# Patient Record
Sex: FEMALE | HISPANIC OR LATINO | Employment: FULL TIME | URBAN - METROPOLITAN AREA
[De-identification: names, ages, dates, MRNs, and addresses within clinical notes are randomized per-mention and may not be internally consistent; named-entity substitution may affect disease eponyms.]

---

## 2024-02-19 ENCOUNTER — LAB (OUTPATIENT)
Dept: LAB | Facility: LAB | Age: 29
End: 2024-02-19
Payer: COMMERCIAL

## 2024-02-19 ENCOUNTER — OFFICE VISIT (OUTPATIENT)
Dept: OBSTETRICS AND GYNECOLOGY | Facility: CLINIC | Age: 29
End: 2024-02-19
Payer: COMMERCIAL

## 2024-02-19 VITALS
SYSTOLIC BLOOD PRESSURE: 102 MMHG | BODY MASS INDEX: 31.15 KG/M2 | DIASTOLIC BLOOD PRESSURE: 64 MMHG | HEIGHT: 61 IN | WEIGHT: 165 LBS

## 2024-02-19 DIAGNOSIS — Z32.00 ENCOUNTER FOR CONFIRMATION OF PREGNANCY TEST RESULT WITH PHYSICAL EXAMINATION: ICD-10-CM

## 2024-02-19 DIAGNOSIS — Z32.01 PREGNANCY TEST POSITIVE (HHS-HCC): ICD-10-CM

## 2024-02-19 DIAGNOSIS — O99.210 OBESITY DURING PREGNANCY (HHS-HCC): ICD-10-CM

## 2024-02-19 DIAGNOSIS — N89.8 VAGINAL ITCHING: ICD-10-CM

## 2024-02-19 DIAGNOSIS — Z32.00 ENCOUNTER FOR CONFIRMATION OF PREGNANCY TEST RESULT WITH PHYSICAL EXAMINATION: Primary | ICD-10-CM

## 2024-02-19 PROBLEM — O99.019 ANEMIA IN PREGNANCY (HHS-HCC): Status: RESOLVED | Noted: 2024-02-19 | Resolved: 2024-02-19

## 2024-02-19 LAB
ABO GROUP (TYPE) IN BLOOD: NORMAL
ANTIBODY SCREEN: NORMAL
CRL: 21 MM
ERYTHROCYTE [DISTWIDTH] IN BLOOD BY AUTOMATED COUNT: 14.2 % (ref 11.5–14.5)
HCT VFR BLD AUTO: 35.7 % (ref 36–46)
HGB BLD-MCNC: 11.6 G/DL (ref 12–16)
MCH RBC QN AUTO: 30.8 PG (ref 26–34)
MCHC RBC AUTO-ENTMCNC: 32.5 G/DL (ref 32–36)
MCV RBC AUTO: 95 FL (ref 80–100)
NRBC BLD-RTO: 0 /100 WBCS (ref 0–0)
PLATELET # BLD AUTO: 206 X10*3/UL (ref 150–450)
PREGNANCY TEST URINE, POC: POSITIVE
RBC # BLD AUTO: 3.77 X10*6/UL (ref 4–5.2)
RH FACTOR (ANTIGEN D): NORMAL
WBC # BLD AUTO: 7.8 X10*3/UL (ref 4.4–11.3)

## 2024-02-19 PROCEDURE — 76817 TRANSVAGINAL US OBSTETRIC: CPT | Performed by: NURSE PRACTITIONER

## 2024-02-19 PROCEDURE — 99213 OFFICE O/P EST LOW 20 MIN: CPT | Performed by: NURSE PRACTITIONER

## 2024-02-19 PROCEDURE — 1036F TOBACCO NON-USER: CPT | Performed by: NURSE PRACTITIONER

## 2024-02-19 PROCEDURE — 81025 URINE PREGNANCY TEST: CPT | Performed by: NURSE PRACTITIONER

## 2024-02-19 PROCEDURE — 36415 COLL VENOUS BLD VENIPUNCTURE: CPT

## 2024-02-19 PROCEDURE — 86317 IMMUNOASSAY INFECTIOUS AGENT: CPT

## 2024-02-19 PROCEDURE — 86850 RBC ANTIBODY SCREEN: CPT

## 2024-02-19 PROCEDURE — 87389 HIV-1 AG W/HIV-1&-2 AB AG IA: CPT

## 2024-02-19 PROCEDURE — 86780 TREPONEMA PALLIDUM: CPT

## 2024-02-19 PROCEDURE — 83036 HEMOGLOBIN GLYCOSYLATED A1C: CPT

## 2024-02-19 PROCEDURE — 87340 HEPATITIS B SURFACE AG IA: CPT

## 2024-02-19 PROCEDURE — 87800 DETECT AGNT MULT DNA DIREC: CPT

## 2024-02-19 PROCEDURE — 85027 COMPLETE CBC AUTOMATED: CPT

## 2024-02-19 PROCEDURE — 86803 HEPATITIS C AB TEST: CPT

## 2024-02-19 PROCEDURE — 87205 SMEAR GRAM STAIN: CPT

## 2024-02-19 PROCEDURE — 86900 BLOOD TYPING SEROLOGIC ABO: CPT

## 2024-02-19 PROCEDURE — 86901 BLOOD TYPING SEROLOGIC RH(D): CPT

## 2024-02-19 ASSESSMENT — ENCOUNTER SYMPTOMS
RESPIRATORY NEGATIVE: 1
VOMITING: 1
HEADACHES: 1
NAUSEA: 1
EYES NEGATIVE: 1
ALLERGIC/IMMUNOLOGIC NEGATIVE: 1
HEMATOLOGIC/LYMPHATIC NEGATIVE: 1
FATIGUE: 1
ENDOCRINE NEGATIVE: 1
CARDIOVASCULAR NEGATIVE: 1
CONSTIPATION: 1
MUSCULOSKELETAL NEGATIVE: 1

## 2024-02-19 NOTE — PROGRESS NOTES
"Chief Complaint    GYN - PREGNANCY CONFIRMATION        HPI    PREGNANCY CONFIRMATION    LMP - 2023  G 2  P 1  8 W   4 D  CALLIE 2024    LAST PAP - 2021 NEG  Last edited by Una Yusuf MA on 2024  3:34 PM.         Naa Sharif is a 28 y.o.  female who presents for a pregnancy confirmation.     Patient's last menstrual period was 2023 (approximate).  She is 8.4 weeks gestation with CALLIE 2024.  Patient's menstrual cycles are mostly regular, every 28 days, lasting 5-6 days. She had x2 cycles in November.   She had +home pregnancy test mid-January.   Denies vaginal bleeding.  Reports symptoms of nausea, vomiting. Also endorses fatigue, vaginal itching.   She is a non-smoker.    Her pregnancy is complicated by:  Obesity    PMH  Anxiety, depression    /64   Ht 1.549 m (5' 1\")   Wt 74.8 kg (165 lb)   LMP 2023 (Approximate)   BMI 31.18 kg/m²      Review of Systems   Constitutional:  Positive for fatigue.   HENT: Negative.     Eyes: Negative.    Respiratory: Negative.     Cardiovascular: Negative.    Gastrointestinal:  Positive for constipation, nausea and vomiting.   Endocrine: Negative.    Genitourinary:         +Vaginal itching   Musculoskeletal: Negative.    Skin: Negative.    Allergic/Immunologic: Negative.    Neurological:  Positive for headaches.   Hematological: Negative.    Psychiatric/Behavioral:          +Anxiety   All other systems reviewed and are negative.       Physical Exam  Constitutional:       Appearance: Normal appearance.   HENT:      Head: Normocephalic.   Pulmonary:      Effort: Pulmonary effort is normal.   Genitourinary:     Vagina: Vaginal discharge (White) present.      Cervix: Normal.      Uterus: Normal.       Adnexa: Right adnexa normal and left adnexa normal.      Rectum: Normal.          Comments: Labial erythema  Musculoskeletal:         General: Normal range of motion.      Cervical back: Normal range of motion and neck supple. "   Skin:     General: Skin is warm and dry.   Neurological:      Mental Status: She is alert.   Psychiatric:         Mood and Affect: Mood normal.         Behavior: Behavior normal.        BSUS: Single IUP, +YS and +FCA. CRL 8.4 weeks gestation.     Assessment/Plan   Diagnoses and all orders for this visit:  Encounter for confirmation of pregnancy test result with physical examination  -Seen today for confirmation of pregnancy.  -Pregnancy confirmed with +urine pregnancy test and trans-vaginal US performed today.  -     US OB transvaginal  -Viable, godinez pregnancy. +fetal heartbeat with US confirming gestational age that is c/w LMP.  -CALLIE 09/26/2024.  -Orders for prenatal lab work today:  -     CBC Anemia Panel With Reflex,Pregnancy; Future  -     Hepatitis B Surface Antigen; Future  -     Hepatitis C Antibody; Future  -     HIV 1/2 Antigen/Antibody Screen with Reflex to Confirmation; Future  -     Rubella Antibody, IgG; Future  -     Syphilis Screen with Reflex; Future  -     Type And Screen; Future  -Genetic screening options discussed, patient considering.   -Follow up for new OB 2-3 weeks.   Pregnancy test positive  -     POCT pregnancy, urine manually resulted  Obesity during pregnancy  -     Hemoglobin A1C; Future  Vaginal itching  -Vaginal cultures collected:  -     Vaginitis Gram Stain For Bacterial Vaginosis + Yeast  -     C. Trachomatis / N. Gonorrhoeae, Amplified Detection

## 2024-02-20 LAB
C TRACH RRNA SPEC QL NAA+PROBE: NEGATIVE
CLUE CELLS VAG LPF-#/AREA: PRESENT /[LPF]
EST. AVERAGE GLUCOSE BLD GHB EST-MCNC: 108 MG/DL
HBA1C MFR BLD: 5.4 %
HBV SURFACE AG SERPL QL IA: NONREACTIVE
HCV AB SER QL: NONREACTIVE
HIV 1+2 AB+HIV1 P24 AG SERPL QL IA: NONREACTIVE
N GONORRHOEA DNA SPEC QL PROBE+SIG AMP: NEGATIVE
NUGENT SCORE: 8
REFLEX ADDED, ANEMIA PANEL: NORMAL
RUBV IGG SERPL IA-ACNC: 1.6 IA
RUBV IGG SERPL QL IA: POSITIVE
TREPONEMA PALLIDUM IGG+IGM AB [PRESENCE] IN SERUM OR PLASMA BY IMMUNOASSAY: NONREACTIVE
YEAST VAG WET PREP-#/AREA: ABNORMAL

## 2024-02-21 DIAGNOSIS — B96.89 BACTERIAL VAGINOSIS: Primary | ICD-10-CM

## 2024-02-21 DIAGNOSIS — N76.0 BACTERIAL VAGINOSIS: Primary | ICD-10-CM

## 2024-02-21 RX ORDER — METRONIDAZOLE 500 MG/1
500 TABLET ORAL 2 TIMES DAILY
Qty: 14 TABLET | Refills: 0 | Status: SHIPPED | OUTPATIENT
Start: 2024-02-21 | End: 2024-02-28

## 2024-03-04 ENCOUNTER — INITIAL PRENATAL (OUTPATIENT)
Dept: OBSTETRICS AND GYNECOLOGY | Facility: CLINIC | Age: 29
End: 2024-03-04
Payer: COMMERCIAL

## 2024-03-04 VITALS — DIASTOLIC BLOOD PRESSURE: 60 MMHG | WEIGHT: 165 LBS | SYSTOLIC BLOOD PRESSURE: 106 MMHG | BODY MASS INDEX: 31.18 KG/M2

## 2024-03-04 DIAGNOSIS — O99.210 OBESITY DURING PREGNANCY (HHS-HCC): ICD-10-CM

## 2024-03-04 DIAGNOSIS — Z34.81 PRENATAL CARE, SUBSEQUENT PREGNANCY, FIRST TRIMESTER (HHS-HCC): Primary | ICD-10-CM

## 2024-03-04 DIAGNOSIS — B37.9 YEAST INFECTION: ICD-10-CM

## 2024-03-04 DIAGNOSIS — Z3A.10 10 WEEKS GESTATION OF PREGNANCY (HHS-HCC): ICD-10-CM

## 2024-03-04 DIAGNOSIS — Z12.4 CERVICAL CANCER SCREENING: ICD-10-CM

## 2024-03-04 LAB
POC APPEARANCE, URINE: CLEAR
POC BILIRUBIN, URINE: NEGATIVE
POC BLOOD, URINE: NEGATIVE
POC COLOR, URINE: YELLOW
POC GLUCOSE, URINE: NEGATIVE MG/DL
POC KETONES, URINE: NEGATIVE MG/DL
POC LEUKOCYTES, URINE: NEGATIVE
POC NITRITE,URINE: NEGATIVE
POC PH, URINE: 6.5 PH
POC PROTEIN, URINE: NEGATIVE MG/DL
POC SPECIFIC GRAVITY, URINE: 1.02
POC UROBILINOGEN, URINE: 0.2 EU/DL

## 2024-03-04 PROCEDURE — 0500F INITIAL PRENATAL CARE VISIT: CPT | Performed by: NURSE PRACTITIONER

## 2024-03-04 PROCEDURE — 88175 CYTOPATH C/V AUTO FLUID REDO: CPT

## 2024-03-04 PROCEDURE — 87624 HPV HI-RISK TYP POOLED RSLT: CPT

## 2024-03-04 PROCEDURE — 87086 URINE CULTURE/COLONY COUNT: CPT

## 2024-03-04 ASSESSMENT — SOCIAL DETERMINANTS OF HEALTH (SDOH)
WITHIN THE LAST YEAR, HAVE YOU BEEN HUMILIATED OR EMOTIONALLY ABUSED IN OTHER WAYS BY YOUR PARTNER OR EX-PARTNER?: NO
WITHIN THE LAST YEAR, HAVE YOU BEEN KICKED, HIT, SLAPPED, OR OTHERWISE PHYSICALLY HURT BY YOUR PARTNER OR EX-PARTNER?: NO
WITHIN THE LAST YEAR, HAVE YOU BEEN AFRAID OF YOUR PARTNER OR EX-PARTNER?: NO
WITHIN THE LAST YEAR, HAVE TO BEEN RAPED OR FORCED TO HAVE ANY KIND OF SEXUAL ACTIVITY BY YOUR PARTNER OR EX-PARTNER?: NO

## 2024-03-04 NOTE — PROGRESS NOTES
Naa Sharif 28 y.o.  at 10w4d with a working estimated date of delivery of Estimated Date of Delivery: 24 who presents for initial prenatal visit.   She is feeling well overall. Reports nausea, manageable. Mostly smell aversion related. Denies vaginal bleeding. She was treated for BV, symptoms improved. Has x2 days left of antibiotic. Also reports some constipation, advised fluids, increasing fiber, and may try stool softener as needed.   Counseled and advised flu vaccine, patient declines.     Her pregnancy is complicated by:  Obesity    Pap w/HPV testing collected today.   NG/CT Negative   Ucx collected today  Prenatal labs normal.   Genetic screening options discussed, patient declines.     Objective   Fetal Heart Rate: +US  BP: 106/60  Urine Dipstick  Urine Protein: NEGATIVE  Urine Leukocytes: NEGATIVE  Urine Ketone: NEGATIVE  Urine Glucose: NEGATIVE  Weight  Weight: 74.8 kg (165 lb)     BSUS: S=D, +FCA and +FM      Assessment/Plan   Diagnoses and all orders for this visit:  Prenatal care, subsequent pregnancy, first trimester  -     Urine culture  10 weeks gestation of pregnancy  -     POCT UA (nonautomated) manually resulted  Cervical cancer screening  -     THINPREP PAP  Obesity during pregnancy   Patient oriented to practice including shared OB call and how to reach physician on call  Declines genetics.  Pap and urine cultures collected today.  Follow up routine prenatal visit 4 weeks or sooner if needed.

## 2024-03-05 LAB — BACTERIA UR CULT: NORMAL

## 2024-03-18 LAB
CYTOLOGY CMNT CVX/VAG CYTO-IMP: NORMAL
HPV HR 12 DNA GENITAL QL NAA+PROBE: POSITIVE
HPV HR GENOTYPES PNL CVX NAA+PROBE: POSITIVE
HPV16 DNA SPEC QL NAA+PROBE: NEGATIVE
HPV18 DNA SPEC QL NAA+PROBE: NEGATIVE
LAB AP HPV GENOTYPE QUESTION: YES
LAB AP HPV HR: NORMAL
LABORATORY COMMENT REPORT: NORMAL
LMP START DATE: NORMAL
MENSTRUAL HX REPORTED: NORMAL
PATH REPORT.TOTAL CANCER: NORMAL

## 2024-03-22 ENCOUNTER — TELEPHONE (OUTPATIENT)
Dept: OBSTETRICS AND GYNECOLOGY | Facility: CLINIC | Age: 29
End: 2024-03-22
Payer: COMMERCIAL

## 2024-03-22 RX ORDER — TERCONAZOLE 4 MG/G
1 CREAM VAGINAL NIGHTLY
Qty: 45 G | Refills: 0 | Status: SHIPPED | OUTPATIENT
Start: 2024-03-22 | End: 2024-03-29

## 2024-04-01 ENCOUNTER — ROUTINE PRENATAL (OUTPATIENT)
Dept: OBSTETRICS AND GYNECOLOGY | Facility: CLINIC | Age: 29
End: 2024-04-01
Payer: COMMERCIAL

## 2024-04-01 VITALS — BODY MASS INDEX: 31.55 KG/M2 | DIASTOLIC BLOOD PRESSURE: 70 MMHG | WEIGHT: 167 LBS | SYSTOLIC BLOOD PRESSURE: 110 MMHG

## 2024-04-01 DIAGNOSIS — Z34.80 SUPERVISION OF OTHER NORMAL PREGNANCY, ANTEPARTUM (HHS-HCC): Primary | ICD-10-CM

## 2024-04-01 DIAGNOSIS — Z3A.14 14 WEEKS GESTATION OF PREGNANCY (HHS-HCC): ICD-10-CM

## 2024-04-01 LAB
POC BLOOD, URINE: NEGATIVE
POC GLUCOSE, URINE: NEGATIVE MG/DL
POC KETONES, URINE: NEGATIVE MG/DL
POC LEUKOCYTES, URINE: NEGATIVE
POC NITRITE,URINE: NEGATIVE
POC PROTEIN, URINE: NEGATIVE MG/DL

## 2024-04-01 PROCEDURE — 0501F PRENATAL FLOW SHEET: CPT | Performed by: OBSTETRICS & GYNECOLOGY

## 2024-04-01 NOTE — PROGRESS NOTES
Subjective   Patient ID [id number]   Naa Sharif is a 28 y.o.  at 14w4d with a working estimated date of delivery of 2024, by Last Menstrual Period who presents for a routine prenatal visit. She denies vaginal bleeding, leakage of fluid, decreased fetal movements, or contractions.  +FM, no LOF, VB, ctx    She reports she is feeling okay. She has fatigue and has not had much of an appetite but does force herself to eat.     Her pregnancy is complicated by:  Obesity BMI 31    Assessment/Plan   Continue prenatal vitamin.  Labs reviewed.  Anatomy scan ordered  Follow up in 4 weeks for a routine prenatal visit.      Scribe Attestation  By signing my name below, IAretha Scribe   attest that this documentation has been prepared under the direction and in the presence of Sebas Jay MD.    Scribe Attestation  By signing my name below, Neha CHACON Scribe   attest that this documentation has been prepared under the direction and in the presence of Sebas Jay MD.

## 2024-04-30 NOTE — PROGRESS NOTES
Subjective   Patient ID [id number]   Naa Sharif is a 28 y.o.  at 19w0d, +FM, no VB/LOF/Cramping. Little discharge.    Pregnancy complicated by:  BMI 31  HPV positive    Objective   Physical Exam  Weight: 78.3 kg (172 lb 9.6 oz)  BP: 106/60  Fetal Heart Rate: 150 Fundal Height (cm): 20 cm    Lab Results   Component Value Date    HGB 11.6 (L) 2024    HCT 35.7 (L) 2024       Assessment/Plan   Anatomy ultrasound next Friday.  Follow up in 4 weeks for a routine prenatal visit.

## 2024-05-02 ENCOUNTER — ROUTINE PRENATAL (OUTPATIENT)
Dept: OBSTETRICS AND GYNECOLOGY | Facility: CLINIC | Age: 29
End: 2024-05-02
Payer: COMMERCIAL

## 2024-05-02 VITALS — BODY MASS INDEX: 32.61 KG/M2 | WEIGHT: 172.6 LBS | SYSTOLIC BLOOD PRESSURE: 106 MMHG | DIASTOLIC BLOOD PRESSURE: 60 MMHG

## 2024-05-02 DIAGNOSIS — Z34.80 SUPERVISION OF OTHER NORMAL PREGNANCY, ANTEPARTUM (HHS-HCC): ICD-10-CM

## 2024-05-02 LAB
POC GLUCOSE, URINE: NEGATIVE MG/DL
POC KETONES, URINE: ABNORMAL MG/DL
POC PROTEIN, URINE: ABNORMAL MG/DL

## 2024-05-02 PROCEDURE — 0501F PRENATAL FLOW SHEET: CPT | Performed by: OBSTETRICS & GYNECOLOGY

## 2024-05-10 ENCOUNTER — ANCILLARY ORDERS (OUTPATIENT)
Dept: OBSTETRICS AND GYNECOLOGY | Facility: CLINIC | Age: 29
End: 2024-05-10

## 2024-05-10 ENCOUNTER — HOSPITAL ENCOUNTER (OUTPATIENT)
Dept: RADIOLOGY | Facility: CLINIC | Age: 29
Discharge: HOME | End: 2024-05-10
Payer: COMMERCIAL

## 2024-05-10 DIAGNOSIS — Z34.80 SUPERVISION OF OTHER NORMAL PREGNANCY, ANTEPARTUM (HHS-HCC): ICD-10-CM

## 2024-05-10 DIAGNOSIS — O99.210 OBESITY AFFECTING PREGNANCY (HHS-HCC): ICD-10-CM

## 2024-05-10 DIAGNOSIS — O99.210 OBESITY AFFECTING PREGNANCY (HHS-HCC): Primary | ICD-10-CM

## 2024-05-10 PROCEDURE — 76811 OB US DETAILED SNGL FETUS: CPT

## 2024-05-10 PROCEDURE — 76811 OB US DETAILED SNGL FETUS: CPT | Performed by: OBSTETRICS & GYNECOLOGY

## 2024-05-31 ENCOUNTER — ROUTINE PRENATAL (OUTPATIENT)
Dept: OBSTETRICS AND GYNECOLOGY | Facility: CLINIC | Age: 29
End: 2024-05-31
Payer: COMMERCIAL

## 2024-05-31 VITALS — WEIGHT: 178 LBS | SYSTOLIC BLOOD PRESSURE: 114 MMHG | DIASTOLIC BLOOD PRESSURE: 60 MMHG | BODY MASS INDEX: 33.63 KG/M2

## 2024-05-31 DIAGNOSIS — Z34.80 SUPERVISION OF OTHER NORMAL PREGNANCY, ANTEPARTUM (HHS-HCC): ICD-10-CM

## 2024-05-31 LAB
POC APPEARANCE, URINE: CLEAR
POC BILIRUBIN, URINE: NEGATIVE
POC BLOOD, URINE: NEGATIVE
POC COLOR, URINE: YELLOW
POC GLUCOSE, URINE: NEGATIVE MG/DL
POC KETONES, URINE: NEGATIVE MG/DL
POC LEUKOCYTES, URINE: NEGATIVE
POC NITRITE,URINE: NEGATIVE
POC PH, URINE: 6.5 PH
POC PROTEIN, URINE: NEGATIVE MG/DL
POC SPECIFIC GRAVITY, URINE: 1.01
POC UROBILINOGEN, URINE: 0.2 EU/DL

## 2024-05-31 PROCEDURE — 0501F PRENATAL FLOW SHEET: CPT | Performed by: OBSTETRICS & GYNECOLOGY

## 2024-05-31 NOTE — PATIENT INSTRUCTIONS
You were seen in the office today for routine OB care and had normal findings on exam  Continue routine OB precautions at home  Continue taking prenatal vitamins   Avoid sick contacts and consider getting your Flu (available in office during flu season) and COVID vaccines to protect against infection in pregnancy  You will be given an order for your anatomy ultrasound. Please schedule at [facility] OB imaging between 19 and 22 weeks gestation [phone]  You will be given a bottle of Glucola and orders for your second trimester labs. Please complete these between 25 and 28 weeks gestation. You may complete these at any  outpatient lab, and do not need an appointment  Make an appointment for routine care in the office in the next 4 weeks  If you are having any concerns prior to your next visit please call the office to speak to the physician on call. This includes after hours, weekends, and holidays, when the answering service will be able to connect you with the physician on call. [phone] ([place] Office) or [phone] [place] [facility].

## 2024-05-31 NOTE — PROGRESS NOTES
Subjective   Patient ID [id number]   Naa Sharif is a 28 y.o.  at 23w1d with a working estimated date of delivery of 2024, by Last Menstrual Period who presents for a routine prenatal visit. She denies vaginal bleeding, leakage of fluid, decreased fetal movements, or contractions.    Her pregnancy is complicated by:  HPV pos  Obesity    Objective   Physical Exam:   Weight: 80.7 kg (178 lb)  Expected Total Weight Gain: 5 kg (11 lb)-9 kg (19 lb)   Pregravid BMI: 31.19  BP: 114/60                  Prenatal Labs  Urine Dip:  Lab Results   Component Value Date    KETONESU NEGATIVE 2024     Lab Results   Component Value Date    HGB 11.6 (L) 2024    HCT 35.7 (L) 2024    ABO O 2024    HEPBSAG Nonreactive 2024           Imagin weeks presentation    Assessment/Plan   Problem List Items Addressed This Visit    None  Visit Diagnoses         Codes    Supervision of other normal pregnancy, antepartum ([facility])     Z34.80    Patient doing well   GTT/CBC ordered  Precautions given  RTO 4 weeks JOCELYN     Relevant Orders    POCT UA (nonautomated) manually resulted (Completed)    CBC Anemia Panel With Reflex,Pregnancy    Glucose, 1 Hour Screen, Pregnancy          Follow up in 4 week for a routine prenatal visit.  Veena Edward DO

## 2024-06-11 ENCOUNTER — TELEPHONE (OUTPATIENT)
Dept: OBSTETRICS AND GYNECOLOGY | Facility: CLINIC | Age: 29
End: 2024-06-11
Payer: COMMERCIAL

## 2024-06-24 ENCOUNTER — TELEPHONE (OUTPATIENT)
Dept: OBSTETRICS AND GYNECOLOGY | Facility: CLINIC | Age: 29
End: 2024-06-24

## 2024-06-24 ENCOUNTER — ROUTINE PRENATAL (OUTPATIENT)
Dept: OBSTETRICS AND GYNECOLOGY | Facility: CLINIC | Age: 29
End: 2024-06-24
Payer: COMMERCIAL

## 2024-06-24 ENCOUNTER — APPOINTMENT (OUTPATIENT)
Dept: OBSTETRICS AND GYNECOLOGY | Facility: CLINIC | Age: 29
End: 2024-06-24
Payer: COMMERCIAL

## 2024-06-24 VITALS — DIASTOLIC BLOOD PRESSURE: 60 MMHG | BODY MASS INDEX: 33.63 KG/M2 | WEIGHT: 178 LBS | SYSTOLIC BLOOD PRESSURE: 118 MMHG

## 2024-06-24 DIAGNOSIS — J06.9 URI, ACUTE: Primary | ICD-10-CM

## 2024-06-24 DIAGNOSIS — N89.8 VAGINAL DISCHARGE: ICD-10-CM

## 2024-06-24 DIAGNOSIS — Z34.80 SUPERVISION OF OTHER NORMAL PREGNANCY, ANTEPARTUM (HHS-HCC): ICD-10-CM

## 2024-06-24 LAB
POC APPEARANCE, URINE: CLEAR
POC BILIRUBIN, URINE: NEGATIVE
POC BLOOD, URINE: NEGATIVE
POC COLOR, URINE: YELLOW
POC GLUCOSE, URINE: NEGATIVE MG/DL
POC KETONES, URINE: NEGATIVE MG/DL
POC LEUKOCYTES, URINE: NEGATIVE
POC NITRITE,URINE: NEGATIVE
POC PH, URINE: 7 PH
POC PROTEIN, URINE: NEGATIVE MG/DL
POC SPECIFIC GRAVITY, URINE: 1.01
POC UROBILINOGEN, URINE: 0.2 EU/DL

## 2024-06-24 PROCEDURE — 87205 SMEAR GRAM STAIN: CPT

## 2024-06-24 PROCEDURE — 0501F PRENATAL FLOW SHEET: CPT | Performed by: OBSTETRICS & GYNECOLOGY

## 2024-06-24 NOTE — PROGRESS NOTES
Subjective   Patient ID [id number]   Naa Sharif is a 29 y.o.  at 26w4d with a working estimated date of delivery of 2024, by Last Menstrual Period who presents with fever, chills, vaginal discharge and lower vaginal soreness.    Symptoms of congestion, cough, and sinus issues that started day. Yesterday she developed fever and chills. Now having some lower vaginal soreness/pressure and vaginal discharge, thinks may be related to coughing but was not sure. No concerns for STDs. +FM. No ctx, no VB.    Home treatments include Claritin.    Pregnancy notable for:  -HPV positive: NIL/HPV pos, 16/18 neg at [facility], repeat pap/cotest postpartum  -Blood type O+  -Obesity: Pregravid BMI 31, Baseline A1c 5.4     Assessment/Plan     URI:  - pt here with upper respiratory infection  - presumed viral etiology  -  will r/o covid: PCR ordered, patient to present to lab  -  recommend symptomatic management  - RTC in 10 days, if still symptomatic with URI sx would consider abx tx for possible bacterial source.    Vaginal pressure/discharge  - cervix closed, no evidence of labor  - white discharge, BV/yeast sent    JOCELYN  -already has CBC and GCT order  - anatomy US 5/10/2024 reviewed, EFW 65%, negative anatomy US, posterior placenta, follow-up as clinically indicated.    RTC in 10 days.    Scribe Attestation  By signing my name below, INeha, Scribe   attest that this documentation has been prepared under the direction and in the presence of Sebas Jay MD.

## 2024-06-25 ENCOUNTER — TELEPHONE (OUTPATIENT)
Dept: OBSTETRICS AND GYNECOLOGY | Facility: CLINIC | Age: 29
End: 2024-06-25
Payer: COMMERCIAL

## 2024-06-25 LAB
CLUE CELLS VAG LPF-#/AREA: ABNORMAL /[LPF]
NUGENT SCORE: 1
YEAST VAG WET PREP-#/AREA: PRESENT

## 2024-06-26 ENCOUNTER — APPOINTMENT (OUTPATIENT)
Dept: OBSTETRICS AND GYNECOLOGY | Facility: CLINIC | Age: 29
End: 2024-06-26
Payer: COMMERCIAL

## 2024-06-26 DIAGNOSIS — B37.31 VULVOVAGINITIS DUE TO YEAST: Primary | ICD-10-CM

## 2024-06-26 RX ORDER — FLUCONAZOLE 150 MG/1
150 TABLET ORAL ONCE
Qty: 1 TABLET | Refills: 0 | Status: SHIPPED | OUTPATIENT
Start: 2024-06-26 | End: 2024-06-26

## 2024-06-26 NOTE — TELEPHONE ENCOUNTER
Pt reports going to urgent care and swab returned for COVID. Pt feeling much better today.   Discussed COVID in pregnancy including recent change in recommendations.

## 2024-06-27 ENCOUNTER — APPOINTMENT (OUTPATIENT)
Dept: RADIOLOGY | Facility: HOSPITAL | Age: 29
End: 2024-06-27
Payer: COMMERCIAL

## 2024-06-27 ENCOUNTER — HOSPITAL ENCOUNTER (OUTPATIENT)
Facility: HOSPITAL | Age: 29
Discharge: HOME | End: 2024-06-28
Attending: OBSTETRICS & GYNECOLOGY | Admitting: OBSTETRICS & GYNECOLOGY
Payer: COMMERCIAL

## 2024-06-27 PROCEDURE — 76705 ECHO EXAM OF ABDOMEN: CPT | Performed by: RADIOLOGY

## 2024-06-27 PROCEDURE — 76705 ECHO EXAM OF ABDOMEN: CPT

## 2024-06-27 PROCEDURE — 99215 OFFICE O/P EST HI 40 MIN: CPT | Performed by: OBSTETRICS & GYNECOLOGY

## 2024-06-27 RX ORDER — LIDOCAINE HYDROCHLORIDE 10 MG/ML
0.5 INJECTION INFILTRATION; PERINEURAL ONCE AS NEEDED
Status: DISCONTINUED | OUTPATIENT
Start: 2024-06-27 | End: 2024-06-28 | Stop reason: HOSPADM

## 2024-06-27 RX ORDER — FAMOTIDINE 10 MG/ML
20 INJECTION INTRAVENOUS ONCE
Status: COMPLETED | OUTPATIENT
Start: 2024-06-27 | End: 2024-06-28

## 2024-06-27 RX ORDER — HYDRALAZINE HYDROCHLORIDE 20 MG/ML
5 INJECTION INTRAMUSCULAR; INTRAVENOUS ONCE AS NEEDED
Status: DISCONTINUED | OUTPATIENT
Start: 2024-06-27 | End: 2024-06-28 | Stop reason: HOSPADM

## 2024-06-27 RX ORDER — NIFEDIPINE 10 MG/1
10 CAPSULE ORAL ONCE AS NEEDED
Status: DISCONTINUED | OUTPATIENT
Start: 2024-06-27 | End: 2024-06-28 | Stop reason: HOSPADM

## 2024-06-27 RX ORDER — ONDANSETRON 4 MG/1
4 TABLET, FILM COATED ORAL EVERY 6 HOURS PRN
Status: DISCONTINUED | OUTPATIENT
Start: 2024-06-27 | End: 2024-06-28 | Stop reason: HOSPADM

## 2024-06-27 RX ORDER — LABETALOL HYDROCHLORIDE 5 MG/ML
20 INJECTION, SOLUTION INTRAVENOUS ONCE AS NEEDED
Status: DISCONTINUED | OUTPATIENT
Start: 2024-06-27 | End: 2024-06-28 | Stop reason: HOSPADM

## 2024-06-27 RX ORDER — ONDANSETRON HYDROCHLORIDE 2 MG/ML
4 INJECTION, SOLUTION INTRAVENOUS EVERY 6 HOURS PRN
Status: DISCONTINUED | OUTPATIENT
Start: 2024-06-27 | End: 2024-06-28 | Stop reason: HOSPADM

## 2024-06-27 ASSESSMENT — PAIN SCALES - GENERAL: PAINLEVEL_OUTOF10: 8

## 2024-06-28 ENCOUNTER — HOSPITAL ENCOUNTER (OUTPATIENT)
Facility: HOSPITAL | Age: 29
End: 2024-06-28
Attending: OBSTETRICS & GYNECOLOGY | Admitting: OBSTETRICS & GYNECOLOGY
Payer: COMMERCIAL

## 2024-06-28 ENCOUNTER — LAB (OUTPATIENT)
Dept: LAB | Facility: LAB | Age: 29
End: 2024-06-28
Payer: COMMERCIAL

## 2024-06-28 VITALS
TEMPERATURE: 98.4 F | HEIGHT: 61 IN | BODY MASS INDEX: 33.67 KG/M2 | RESPIRATION RATE: 18 BRPM | HEART RATE: 74 BPM | DIASTOLIC BLOOD PRESSURE: 55 MMHG | WEIGHT: 178.35 LBS | OXYGEN SATURATION: 97 % | SYSTOLIC BLOOD PRESSURE: 101 MMHG

## 2024-06-28 DIAGNOSIS — Z34.80 SUPERVISION OF OTHER NORMAL PREGNANCY, ANTEPARTUM (HHS-HCC): ICD-10-CM

## 2024-06-28 LAB
ALBUMIN SERPL BCP-MCNC: 3.2 G/DL (ref 3.4–5)
ALP SERPL-CCNC: 99 U/L (ref 33–110)
ALT SERPL W P-5'-P-CCNC: 11 U/L (ref 7–45)
AMYLASE SERPL-CCNC: 41 U/L (ref 29–103)
ANION GAP SERPL CALC-SCNC: 11 MMOL/L (ref 10–20)
AST SERPL W P-5'-P-CCNC: 13 U/L (ref 9–39)
BASOPHILS # BLD AUTO: 0.05 X10*3/UL (ref 0–0.1)
BASOPHILS NFR BLD AUTO: 0.6 %
BILIRUB SERPL-MCNC: 0.2 MG/DL (ref 0–1.2)
BUN SERPL-MCNC: 7 MG/DL (ref 6–23)
CALCIUM SERPL-MCNC: 8.4 MG/DL (ref 8.6–10.3)
CHLORIDE SERPL-SCNC: 108 MMOL/L (ref 98–107)
CO2 SERPL-SCNC: 21 MMOL/L (ref 21–32)
CREAT SERPL-MCNC: 0.56 MG/DL (ref 0.5–1.05)
EGFRCR SERPLBLD CKD-EPI 2021: >90 ML/MIN/1.73M*2
EOSINOPHIL # BLD AUTO: 0.25 X10*3/UL (ref 0–0.7)
EOSINOPHIL NFR BLD AUTO: 2.9 %
ERYTHROCYTE [DISTWIDTH] IN BLOOD BY AUTOMATED COUNT: 13.7 % (ref 11.5–14.5)
ERYTHROCYTE [DISTWIDTH] IN BLOOD BY AUTOMATED COUNT: 14 % (ref 11.5–14.5)
GLUCOSE 1H P 50 G GLC PO SERPL-MCNC: 97 MG/DL
GLUCOSE SERPL-MCNC: 95 MG/DL (ref 74–99)
HCT VFR BLD AUTO: 28.1 % (ref 36–46)
HCT VFR BLD AUTO: 30.8 % (ref 36–46)
HGB BLD-MCNC: 9.2 G/DL (ref 12–16)
HGB BLD-MCNC: 9.9 G/DL (ref 12–16)
IMM GRANULOCYTES # BLD AUTO: 0.08 X10*3/UL (ref 0–0.7)
IMM GRANULOCYTES NFR BLD AUTO: 0.9 % (ref 0–0.9)
LIPASE SERPL-CCNC: 15 U/L (ref 9–82)
LYMPHOCYTES # BLD AUTO: 1.24 X10*3/UL (ref 1.2–4.8)
LYMPHOCYTES NFR BLD AUTO: 14.6 %
MCH RBC QN AUTO: 30.4 PG (ref 26–34)
MCH RBC QN AUTO: 31.1 PG (ref 26–34)
MCHC RBC AUTO-ENTMCNC: 32.1 G/DL (ref 32–36)
MCHC RBC AUTO-ENTMCNC: 32.7 G/DL (ref 32–36)
MCV RBC AUTO: 93 FL (ref 80–100)
MCV RBC AUTO: 97 FL (ref 80–100)
MONOCYTES # BLD AUTO: 0.55 X10*3/UL (ref 0.1–1)
MONOCYTES NFR BLD AUTO: 6.5 %
NEUTROPHILS # BLD AUTO: 6.31 X10*3/UL (ref 1.2–7.7)
NEUTROPHILS NFR BLD AUTO: 74.5 %
NRBC BLD-RTO: 0 /100 WBCS (ref 0–0)
NRBC BLD-RTO: 0 /100 WBCS (ref 0–0)
PLATELET # BLD AUTO: 144 X10*3/UL (ref 150–450)
PLATELET # BLD AUTO: 156 X10*3/UL (ref 150–450)
POTASSIUM SERPL-SCNC: 3.3 MMOL/L (ref 3.5–5.3)
PROT SERPL-MCNC: 6.4 G/DL (ref 6.4–8.2)
RBC # BLD AUTO: 3.03 X10*6/UL (ref 4–5.2)
RBC # BLD AUTO: 3.18 X10*6/UL (ref 4–5.2)
SODIUM SERPL-SCNC: 137 MMOL/L (ref 136–145)
URATE SERPL-MCNC: 4.5 MG/DL (ref 2.3–6.7)
WBC # BLD AUTO: 7.4 X10*3/UL (ref 4.4–11.3)
WBC # BLD AUTO: 8.5 X10*3/UL (ref 4.4–11.3)

## 2024-06-28 PROCEDURE — 99212 OFFICE O/P EST SF 10 MIN: CPT

## 2024-06-28 PROCEDURE — 82150 ASSAY OF AMYLASE: CPT | Performed by: OBSTETRICS & GYNECOLOGY

## 2024-06-28 PROCEDURE — 83690 ASSAY OF LIPASE: CPT | Performed by: OBSTETRICS & GYNECOLOGY

## 2024-06-28 PROCEDURE — 83550 IRON BINDING TEST: CPT

## 2024-06-28 PROCEDURE — 82746 ASSAY OF FOLIC ACID SERUM: CPT

## 2024-06-28 PROCEDURE — 36415 COLL VENOUS BLD VENIPUNCTURE: CPT

## 2024-06-28 PROCEDURE — 80053 COMPREHEN METABOLIC PANEL: CPT | Performed by: OBSTETRICS & GYNECOLOGY

## 2024-06-28 PROCEDURE — 82947 ASSAY GLUCOSE BLOOD QUANT: CPT

## 2024-06-28 PROCEDURE — 36415 COLL VENOUS BLD VENIPUNCTURE: CPT | Performed by: OBSTETRICS & GYNECOLOGY

## 2024-06-28 PROCEDURE — 2500000004 HC RX 250 GENERAL PHARMACY W/ HCPCS (ALT 636 FOR OP/ED): Performed by: OBSTETRICS & GYNECOLOGY

## 2024-06-28 PROCEDURE — 85025 COMPLETE CBC W/AUTO DIFF WBC: CPT | Performed by: OBSTETRICS & GYNECOLOGY

## 2024-06-28 PROCEDURE — 82607 VITAMIN B-12: CPT

## 2024-06-28 PROCEDURE — 85027 COMPLETE CBC AUTOMATED: CPT

## 2024-06-28 PROCEDURE — 84550 ASSAY OF BLOOD/URIC ACID: CPT | Performed by: OBSTETRICS & GYNECOLOGY

## 2024-06-28 PROCEDURE — 82728 ASSAY OF FERRITIN: CPT

## 2024-06-28 NOTE — PROGRESS NOTES
Progress Note    Assessment/Plan   Naa Sharif is a 29 y.o.,  27 wks with RUQ pain  Normal bloodwork, mild anemia.   Normal RUQ ultrasound  Discharge home.  Would recommend start daily Pepcid at home.         Subjective   Feeling better      Objective   Allergies:   Penicillins    Last Vitals:  Temp Pulse Resp BP MAP Pulse Ox   36.9 °C (98.4 °F) 84 18 99/58   97 %     Vitals Min/Max Last 24 Hours:  Temp  Min: 36.9 °C (98.4 °F)  Max: 36.9 °C (98.4 °F)  Pulse  Min: 84  Max: 84  Resp  Min: 18  Max: 18  BP  Min: 99/58  Max: 99/58      Lab Data:  Results for orders placed or performed during the hospital encounter of 24 (from the past 24 hour(s))   CBC and Auto Differential   Result Value Ref Range    WBC 8.5 4.4 - 11.3 x10*3/uL    nRBC 0.0 0.0 - 0.0 /100 WBCs    RBC 3.03 (L) 4.00 - 5.20 x10*6/uL    Hemoglobin 9.2 (L) 12.0 - 16.0 g/dL    Hematocrit 28.1 (L) 36.0 - 46.0 %    MCV 93 80 - 100 fL    MCH 30.4 26.0 - 34.0 pg    MCHC 32.7 32.0 - 36.0 g/dL    RDW 13.7 11.5 - 14.5 %    Platelets 144 (L) 150 - 450 x10*3/uL    Neutrophils % 74.5 40.0 - 80.0 %    Immature Granulocytes %, Automated 0.9 0.0 - 0.9 %    Lymphocytes % 14.6 13.0 - 44.0 %    Monocytes % 6.5 2.0 - 10.0 %    Eosinophils % 2.9 0.0 - 6.0 %    Basophils % 0.6 0.0 - 2.0 %    Neutrophils Absolute 6.31 1.20 - 7.70 x10*3/uL    Immature Granulocytes Absolute, Automated 0.08 0.00 - 0.70 x10*3/uL    Lymphocytes Absolute 1.24 1.20 - 4.80 x10*3/uL    Monocytes Absolute 0.55 0.10 - 1.00 x10*3/uL    Eosinophils Absolute 0.25 0.00 - 0.70 x10*3/uL    Basophils Absolute 0.05 0.00 - 0.10 x10*3/uL   Comprehensive Metabolic Panel   Result Value Ref Range    Glucose 95 74 - 99 mg/dL    Sodium 137 136 - 145 mmol/L    Potassium 3.3 (L) 3.5 - 5.3 mmol/L    Chloride 108 (H) 98 - 107 mmol/L    Bicarbonate 21 21 - 32 mmol/L    Anion Gap 11 10 - 20 mmol/L    Urea Nitrogen 7 6 - 23 mg/dL    Creatinine 0.56 0.50 - 1.05 mg/dL    eGFR >90 >60 mL/min/1.73m*2    Calcium  8.4 (L) 8.6 - 10.3 mg/dL    Albumin 3.2 (L) 3.4 - 5.0 g/dL    Alkaline Phosphatase 99 33 - 110 U/L    Total Protein 6.4 6.4 - 8.2 g/dL    AST 13 9 - 39 U/L    Bilirubin, Total 0.2 0.0 - 1.2 mg/dL    ALT 11 7 - 45 U/L   Amylase   Result Value Ref Range    Amylase 41 29 - 103 U/L   Lipase   Result Value Ref Range    Lipase 15 9 - 82 U/L   Uric Acid   Result Value Ref Range    Uric Acid 4.5 2.3 - 6.7 mg/dL      RUQ ultrasound normal      Binta López MD

## 2024-06-28 NOTE — H&P
Obstetrical Triage History and Physical        IMP/PLAN    27 0/7 weeks  RUQ pain  Covid + 2024  CBC with diff  Comp panel  Amylase  Lipase  RUQ ultrasound      28 yo  27 0/7 weeks with c/o RUQ pain that started this am.   Notes she had mild intermittent pain RUQ earlier in pregnancy that resolved. Denies h/o gall bladder issues  6 days ago had onset of fever, general malaise, cough. Seen in office 2024. Diagnosed with Covid. Cough and fever resolved after two days.   Woke up this am with RUQ discomfort. Pain is 8/10, does not radiate. Had emesis x 1 on the way to [facility]. Can't get comfortable. Was sitting at desk today and needed to lie down. No diarrhea, normal bowel movement today. Denies headache, no visual changes. Denies shortness of breath.       Obstetrical History   OB History    Para Term  AB Living   2 1 1 0 0 1   SAB IAB Ectopic Multiple Live Births   0 0 0 0 1      # Outcome Date GA Lbr Freddy/2nd Weight Sex Delivery Anes PTL Lv   2 Current            1 Term 21 37w0d  2.948 kg M Vag-Spont EPI Y MIKE       Past Medical History  Past Medical History:   Diagnosis Date    Anemia in pregnancy ([facility]) 2024    Personal history of diseases of the skin and subcutaneous tissue 2016    History of abscess of skin and subcutaneous tissue    Postpartum depression 2024        Past Surgical History   Past Surgical History:   Procedure Laterality Date    OTHER SURGICAL HISTORY  2020    No history of surgery       Social History  Social History     Tobacco Use    Smoking status: Never    Smokeless tobacco: Never   Substance Use Topics    Alcohol use: Not Currently     Substance and Sexual Activity   Drug Use Never       Allergies  Penicillins     Medications  Medications Prior to Admission   Medication Sig Dispense Refill Last Dose    [] fluconazole (Diflucan) 150 mg tablet Take 1 tablet (150 mg) by mouth 1 time for 1 dose. 1 tablet 0      prenatal19-iron-folic-omega3 29-1-400 mg combo pack,tablet and cap,DR Take by mouth.          Objective    Last Vitals  Temp Pulse Resp BP MAP O2 Sat   36.9 °C (98.4 °F) 84 18 99/58   97 %     Physical Examination  GENERAL: Examination reveals a well developed, well nourished, gravid female in no acute distress. She is alert and cooperative.  LUNGS: clear to auscultation bilaterally  HEART: regular rate and rhythm, S1, S2 normal, no murmur, click, rub or gallop  ABDOMEN: soft, gravid, nontender, nondistended, no abnormal masses, no epigastric pain. Does have  tenderness to palpation RUQ  FHR is 150   [facility] reading: no contractions.   NEUROLOGICAL: alert, oriented, normal speech, no focal findings or movement disorder noted  PSYCHOLOGICAL: awake and alert; oriented to person, place, and time

## 2024-06-29 LAB
FERRITIN SERPL-MCNC: 19 NG/ML
FOLATE SERPL-MCNC: 19.3 NG/ML
IRON SATN MFR SERPL: 9 %
IRON SERPL-MCNC: 43 UG/DL
REFLEX ADDED, ANEMIA PANEL: NORMAL
TIBC SERPL-MCNC: 472 UG/DL
UIBC SERPL-MCNC: 429 UG/DL
VIT B12 SERPL-MCNC: 281 PG/ML

## 2024-07-03 ENCOUNTER — TELEPHONE (OUTPATIENT)
Dept: OBSTETRICS AND GYNECOLOGY | Facility: CLINIC | Age: 29
End: 2024-07-03
Payer: COMMERCIAL

## 2024-07-08 ENCOUNTER — APPOINTMENT (OUTPATIENT)
Dept: OBSTETRICS AND GYNECOLOGY | Facility: CLINIC | Age: 29
End: 2024-07-08
Payer: COMMERCIAL

## 2024-07-08 VITALS — DIASTOLIC BLOOD PRESSURE: 58 MMHG | WEIGHT: 181 LBS | BODY MASS INDEX: 34.2 KG/M2 | SYSTOLIC BLOOD PRESSURE: 102 MMHG

## 2024-07-08 DIAGNOSIS — O99.013 ANEMIA AFFECTING PREGNANCY IN THIRD TRIMESTER (HHS-HCC): ICD-10-CM

## 2024-07-08 DIAGNOSIS — Z34.80 SUPERVISION OF OTHER NORMAL PREGNANCY, ANTEPARTUM (HHS-HCC): Primary | ICD-10-CM

## 2024-07-08 LAB
POC APPEARANCE, URINE: CLEAR
POC BILIRUBIN, URINE: NEGATIVE
POC BLOOD, URINE: NEGATIVE
POC COLOR, URINE: NORMAL
POC GLUCOSE, URINE: NEGATIVE MG/DL
POC KETONES, URINE: NEGATIVE MG/DL
POC LEUKOCYTES, URINE: NEGATIVE
POC NITRITE,URINE: NEGATIVE
POC PH, URINE: 7 PH
POC PROTEIN, URINE: NEGATIVE MG/DL
POC SPECIFIC GRAVITY, URINE: 1.01
POC UROBILINOGEN, URINE: 0.2 EU/DL

## 2024-07-08 PROCEDURE — 0501F PRENATAL FLOW SHEET: CPT | Performed by: OBSTETRICS & GYNECOLOGY

## 2024-07-08 RX ORDER — FERROUS SULFATE 325(65) MG
65 TABLET, DELAYED RELEASE (ENTERIC COATED) ORAL
COMMUNITY

## 2024-07-08 RX ORDER — FAMOTIDINE 10 MG/1
TABLET ORAL
COMMUNITY

## 2024-07-08 NOTE — PROGRESS NOTES
Prenatal Visit    Patient presents for routine prenatal visit.   Feeling pain in the upper left side yesterday. Seen on L&D 6/27 for right sided pain. Sharp, lasted a few hours, vomited a few times too.   Gone now and feeling ok today.  Baby is moving.  No bleeding. No leaking fluid.    Objective   Physical Exam:   Weight: 82.1 kg (181 lb)  Expected Total Weight Gain: 5 kg (11 lb)-9 kg (19 lb)   Pregravid BMI: 31.19  BP: 102/58  Fetal Heart Rate: 145 Fundal Height (cm): 28 cm         Abdomen is soft, nontender    Assessment/Plan   1. Supervision of other normal pregnancy, antepartum ([facility])  - POCT UA (nonautomated) manually resulted  2. Anemia affecting pregnancy in third trimester ([facility])    Suspect pain is partly due to gas/indigestion. Advised daily Pepcid. Try Maalox in addition if pain returns. Walking when she gas the pain to help dissipate gas.  Anemia noted on last labs. She started iron about 4 days ago. Advised her to get repeat blood work done 2 weeks after starting the iron.  Continue prenatal vitamins  Precautions given. Advised her to call for any concerns.  Follow up in 2 weeks.

## 2024-07-25 ENCOUNTER — APPOINTMENT (OUTPATIENT)
Dept: OBSTETRICS AND GYNECOLOGY | Facility: CLINIC | Age: 29
End: 2024-07-25
Payer: COMMERCIAL

## 2024-07-25 ENCOUNTER — LAB (OUTPATIENT)
Dept: LAB | Facility: LAB | Age: 29
End: 2024-07-25
Payer: COMMERCIAL

## 2024-07-25 VITALS — DIASTOLIC BLOOD PRESSURE: 58 MMHG | BODY MASS INDEX: 34.46 KG/M2 | WEIGHT: 182.4 LBS | SYSTOLIC BLOOD PRESSURE: 110 MMHG

## 2024-07-25 DIAGNOSIS — Z34.80 SUPERVISION OF OTHER NORMAL PREGNANCY, ANTEPARTUM (HHS-HCC): ICD-10-CM

## 2024-07-25 DIAGNOSIS — O99.012 ANEMIA DURING PREGNANCY IN SECOND TRIMESTER (HHS-HCC): ICD-10-CM

## 2024-07-25 LAB
ERYTHROCYTE [DISTWIDTH] IN BLOOD BY AUTOMATED COUNT: 15.1 % (ref 11.5–14.5)
HCT VFR BLD AUTO: 31.5 % (ref 36–46)
HGB BLD-MCNC: 10 G/DL (ref 12–16)
HGB RETIC QN: 32 PG (ref 28–38)
IMMATURE RETIC FRACTION: 25.2 %
MCH RBC QN AUTO: 30.5 PG (ref 26–34)
MCHC RBC AUTO-ENTMCNC: 31.7 G/DL (ref 32–36)
MCV RBC AUTO: 96 FL (ref 80–100)
NRBC BLD-RTO: 0 /100 WBCS (ref 0–0)
PLATELET # BLD AUTO: 148 X10*3/UL (ref 150–450)
POC GLUCOSE, URINE: NEGATIVE MG/DL
POC PROTEIN, URINE: ABNORMAL MG/DL
RBC # BLD AUTO: 3.28 X10*6/UL (ref 4–5.2)
RETICS #: 0.08 X10*6/UL (ref 0.02–0.08)
RETICS/RBC NFR AUTO: 2.4 % (ref 0.5–2)
WBC # BLD AUTO: 8.2 X10*3/UL (ref 4.4–11.3)

## 2024-07-25 PROCEDURE — 83550 IRON BINDING TEST: CPT

## 2024-07-25 PROCEDURE — 85045 AUTOMATED RETICULOCYTE COUNT: CPT

## 2024-07-25 PROCEDURE — 0501F PRENATAL FLOW SHEET: CPT | Performed by: OBSTETRICS & GYNECOLOGY

## 2024-07-25 PROCEDURE — 85027 COMPLETE CBC AUTOMATED: CPT

## 2024-07-25 PROCEDURE — 82746 ASSAY OF FOLIC ACID SERUM: CPT

## 2024-07-25 PROCEDURE — 36415 COLL VENOUS BLD VENIPUNCTURE: CPT

## 2024-07-25 PROCEDURE — 82607 VITAMIN B-12: CPT

## 2024-07-25 PROCEDURE — 82728 ASSAY OF FERRITIN: CPT

## 2024-07-25 NOTE — PROGRESS NOTES
Subjective   Patient ID [id number]   Naa Sharif is a 29 y.o.  at 31w0d with a working estimated date of delivery of 2024, by Last Menstrual Period who presents for a routine prenatal visit. She denies vaginal bleeding, leakage of fluid, decreased fetal movements, or contractions.    Her pregnancy is complicated by:  Anemia  Obesity  HPV pos  COVID    Objective   Physical Exam:   Weight: 82.7 kg (182 lb 6.4 oz)  Expected Total Weight Gain: 5 kg (11 lb)-9 kg (19 lb)   Pregravid BMI: 31.19  BP: 110/58                  Prenatal Labs  Urine Dip:  Lab Results   Component Value Date    KETONESU NEGATIVE 2024     Lab Results   Component Value Date    HGB 9.9 (L) 2024    HCT 30.8 (L) 2024    ABO O 2024    HEPBSAG Nonreactive 2024           Imagin weeks presentation    Assessment/Plan   Problem List Items Addressed This Visit             ICD-10-CM    Supervision of other normal pregnancy, antepartum ([facility]) Z34.80    Relevant Orders    POCT UA Automated manually resulted (Completed)     Continue prenatal vitamin.  Labs reviewed.  GBS 36 weeks  Expected mode of delivery TBD  Follow up in 2 week for a routine prenatal visit.  Veena Edward DO

## 2024-07-25 NOTE — PATIENT INSTRUCTIONS
You were seen in the office today for routine OB care and had normal findings on exam  Continue routine OB precautions at home  Continue taking prenatal vitamins   Avoid sick contacts and consider getting your Flu (available in office during flu season) and COVID vaccines to protect against infection in pregnancy  We recommend getting the Tdap (available in office) and RSV vaccines to pass some immunity from mother to baby and protect your baby against RSV and Whooping cough in the first few months of life. Tdap can be given between 27 and 36 weeks, and RSV vaccinations can be given between 32 and 36 weeks gestation  Make an appointment for routine care in the office in the next 2 weeks  If you are having any painful contractions, vaginal bleeding, leaking amniotic fluid, or decrease in baby's movements prior to your next visit please call the office to speak to the physician on call. This includes after hours, weekends, and holidays, when the answering service will be able to connect you with the physician on call. [phone] ([place] Office) or [phone] ([place] Office).

## 2024-07-26 LAB
FERRITIN SERPL-MCNC: 21 NG/ML
FOLATE SERPL-MCNC: 18.6 NG/ML
IRON SATN MFR SERPL: 32 %
IRON SERPL-MCNC: 141 UG/DL
REFLEX ADDED, ANEMIA PANEL: NORMAL
TIBC SERPL-MCNC: 447 UG/DL
UIBC SERPL-MCNC: 306 UG/DL
VIT B12 SERPL-MCNC: 249 PG/ML

## 2024-08-08 ENCOUNTER — APPOINTMENT (OUTPATIENT)
Dept: OBSTETRICS AND GYNECOLOGY | Facility: CLINIC | Age: 29
End: 2024-08-08
Payer: COMMERCIAL

## 2024-08-08 VITALS — BODY MASS INDEX: 34.77 KG/M2 | WEIGHT: 184 LBS | DIASTOLIC BLOOD PRESSURE: 52 MMHG | SYSTOLIC BLOOD PRESSURE: 100 MMHG

## 2024-08-08 DIAGNOSIS — Z34.80 SUPERVISION OF OTHER NORMAL PREGNANCY, ANTEPARTUM (HHS-HCC): Primary | ICD-10-CM

## 2024-08-08 LAB
POC APPEARANCE, URINE: ABNORMAL
POC BILIRUBIN, URINE: NEGATIVE
POC BLOOD, URINE: NEGATIVE
POC COLOR, URINE: ABNORMAL
POC GLUCOSE, URINE: NEGATIVE MG/DL
POC KETONES, URINE: NEGATIVE MG/DL
POC LEUKOCYTES, URINE: ABNORMAL
POC NITRITE,URINE: NEGATIVE
POC PH, URINE: 7 PH
POC PROTEIN, URINE: NEGATIVE MG/DL
POC SPECIFIC GRAVITY, URINE: 1.01
POC UROBILINOGEN, URINE: 0.2 EU/DL

## 2024-08-08 PROCEDURE — 0501F PRENATAL FLOW SHEET: CPT | Performed by: OBSTETRICS & GYNECOLOGY

## 2024-08-08 NOTE — PROGRESS NOTES
Subjective   Naa Sharif is a 29 y.o.  at 33w0d, presents for a routine prenatal visit.   Feels well with good fetal movement.   Intermittent cramp RUQ. Had RUQ ultrasound this pregnancy, was negative 2024.     Objective     /52   Wt 83.5 kg (184 lb)   LMP 2023 (Approximate)   BMI 34.77 kg/m²     Lab Results   Component Value Date    HGB 10.0 (L) 2024    HCT 31.5 (L) 2024       Plan  1. Supervision of other normal pregnancy, antepartum ([facility])  33 0/7 weeks  - POCT UA (nonautomated) manually resulted   RTO 2 weeks    Binta López MD

## 2024-08-22 ENCOUNTER — APPOINTMENT (OUTPATIENT)
Dept: OBSTETRICS AND GYNECOLOGY | Facility: CLINIC | Age: 29
End: 2024-08-22
Payer: COMMERCIAL

## 2024-08-22 VITALS — DIASTOLIC BLOOD PRESSURE: 60 MMHG | WEIGHT: 185 LBS | SYSTOLIC BLOOD PRESSURE: 100 MMHG | BODY MASS INDEX: 34.96 KG/M2

## 2024-08-22 DIAGNOSIS — Z23 NEED FOR DIPHTHERIA-TETANUS-PERTUSSIS (TDAP) VACCINE: ICD-10-CM

## 2024-08-22 DIAGNOSIS — O99.013 ANEMIA OF PREGNANCY, THIRD TRIMESTER (HHS-HCC): Primary | ICD-10-CM

## 2024-08-22 DIAGNOSIS — Z34.80 SUPERVISION OF OTHER NORMAL PREGNANCY, ANTEPARTUM (HHS-HCC): ICD-10-CM

## 2024-08-22 LAB
POC GLUCOSE, URINE: NEGATIVE MG/DL
POC PROTEIN, URINE: ABNORMAL MG/DL

## 2024-08-22 NOTE — PROGRESS NOTES
Subjective   Patient ID [id number]   Naa Sharif is a 29 y.o.  at 35w0d with a working estimated date of delivery of 2024, by Last Menstrual Period who presents for a routine prenatal visit. She denies vaginal bleeding, leakage of fluid, decreased fetal movements, or contractions.    Her pregnancy is complicated by:  Anemia  HPV pos  Obesity  COVID    Objective   Physical Exam:      Expected Total Weight Gain: 5 kg (11 lb)-9 kg (19 lb)   Pregravid BMI: 31.19                     Prenatal Labs  Urine Dip:  Lab Results   Component Value Date    KETONESU NEGATIVE 2024     Lab Results   Component Value Date    HGB 10.0 (L) 2024    HCT 31.5 (L) 2024    ABO O 2024    HEPBSAG Nonreactive 2024           Imagin weeks for presentation       Assessment/Plan   Problem List Items Addressed This Visit             ICD-10-CM    Supervision of other normal pregnancy, antepartum ([facility]) Z34.80    Relevant Orders    POCT UA Automated manually resulted (Completed)     Continue prenatal vitamin.  Labs reviewed.  GBS 36 weeks  Expected mode of delivery TBD  Follow up in 1 week for a routine prenatal visit.  Veena Edward DO

## 2024-08-22 NOTE — PATIENT INSTRUCTIONS
You were seen in the office today for routine OB care and had normal findings on exam  Continue routine OB precautions at home  Continue taking prenatal vitamins   Avoid sick contacts and consider getting your Flu (available in office during flu season) and COVID vaccines to protect against infection in pregnancy  We recommend getting the Tdap (available in office) and RSV vaccines to pass some immunity from mother to baby and protect your baby against RSV and Whooping cough in the first few months of life. Tdap can be given between 27 and 36 weeks, and RSV vaccinations can be given between 32 and 36 weeks gestation  Make an appointment for routine care in the office in the next 2 weeks  If you are having any painful contractions, vaginal bleeding, leaking amniotic fluid, or decrease in baby's movements prior to your next visit please call the office to speak to the physician on call. This includes after hours, weekends, and holidays, when the answering service will be able to connect you with the physician on call. [phone] ([place] Office) or [phone] ([place] Office).

## 2024-08-29 ENCOUNTER — LAB (OUTPATIENT)
Dept: LAB | Facility: LAB | Age: 29
End: 2024-08-29
Payer: COMMERCIAL

## 2024-08-29 DIAGNOSIS — O99.013 ANEMIA OF PREGNANCY, THIRD TRIMESTER (HHS-HCC): ICD-10-CM

## 2024-08-29 LAB
ERYTHROCYTE [DISTWIDTH] IN BLOOD BY AUTOMATED COUNT: 16.1 % (ref 11.5–14.5)
HCT VFR BLD AUTO: 34.7 % (ref 36–46)
HGB BLD-MCNC: 10.7 G/DL (ref 12–16)
MCH RBC QN AUTO: 30.3 PG (ref 26–34)
MCHC RBC AUTO-ENTMCNC: 30.8 G/DL (ref 32–36)
MCV RBC AUTO: 98 FL (ref 80–100)
NRBC BLD-RTO: 0 /100 WBCS (ref 0–0)
PLATELET # BLD AUTO: 164 X10*3/UL (ref 150–450)
RBC # BLD AUTO: 3.53 X10*6/UL (ref 4–5.2)
WBC # BLD AUTO: 9.5 X10*3/UL (ref 4.4–11.3)

## 2024-08-29 PROCEDURE — 82607 VITAMIN B-12: CPT

## 2024-08-29 PROCEDURE — 85027 COMPLETE CBC AUTOMATED: CPT

## 2024-08-29 PROCEDURE — 82746 ASSAY OF FOLIC ACID SERUM: CPT

## 2024-08-29 PROCEDURE — 82728 ASSAY OF FERRITIN: CPT

## 2024-08-29 PROCEDURE — 83550 IRON BINDING TEST: CPT

## 2024-08-29 PROCEDURE — 36415 COLL VENOUS BLD VENIPUNCTURE: CPT

## 2024-08-30 LAB
FERRITIN SERPL-MCNC: 22 NG/ML
FOLATE SERPL-MCNC: 23.6 NG/ML
IRON SATN MFR SERPL: 15 %
IRON SERPL-MCNC: 73 UG/DL
REFLEX ADDED, ANEMIA PANEL: NORMAL
TIBC SERPL-MCNC: 478 UG/DL
UIBC SERPL-MCNC: 405 UG/DL
VIT B12 SERPL-MCNC: 239 PG/ML

## 2024-09-03 ENCOUNTER — APPOINTMENT (OUTPATIENT)
Dept: OBSTETRICS AND GYNECOLOGY | Facility: CLINIC | Age: 29
End: 2024-09-03
Payer: COMMERCIAL

## 2024-09-03 VITALS — WEIGHT: 187 LBS | DIASTOLIC BLOOD PRESSURE: 62 MMHG | BODY MASS INDEX: 35.33 KG/M2 | SYSTOLIC BLOOD PRESSURE: 100 MMHG

## 2024-09-03 DIAGNOSIS — Z34.80 SUPERVISION OF OTHER NORMAL PREGNANCY, ANTEPARTUM (HHS-HCC): Primary | ICD-10-CM

## 2024-09-03 LAB
POC APPEARANCE, URINE: ABNORMAL
POC BILIRUBIN, URINE: NEGATIVE
POC BLOOD, URINE: NEGATIVE
POC COLOR, URINE: YELLOW
POC GLUCOSE, URINE: NEGATIVE MG/DL
POC KETONES, URINE: NEGATIVE MG/DL
POC LEUKOCYTES, URINE: NEGATIVE
POC NITRITE,URINE: NEGATIVE
POC PH, URINE: 7 PH
POC PROTEIN, URINE: NEGATIVE MG/DL
POC SPECIFIC GRAVITY, URINE: 1.01
POC UROBILINOGEN, URINE: 0.2 EU/DL

## 2024-09-03 PROCEDURE — 87077 CULTURE AEROBIC IDENTIFY: CPT

## 2024-09-03 PROCEDURE — 87081 CULTURE SCREEN ONLY: CPT

## 2024-09-03 PROCEDURE — 0501F PRENATAL FLOW SHEET: CPT | Performed by: OBSTETRICS & GYNECOLOGY

## 2024-09-03 NOTE — LETTER
September 3, 2024     No Recipients    Patient: Naa Sharif   YOB: 1995   Date of Visit: 9/3/2024       Dear Dr. Diaz Recipients:    Thank you for referring Naa Sharif to me for evaluation. Below are my notes for this consultation.  If you have questions, please do not hesitate to call me. I look forward to following your patient along with you.       Sincerely,     Sebas Jay MD      CC: No Recipients  ______________________________________________________________________________________    Subjective  Patient ID [id number]   Naa Sharif is a 29 y.o.  at 36w5d with a working estimated date of delivery of 2024, by Last Menstrual Period who presents for a routine prenatal visit. She denies vaginal bleeding, leakage of fluid, decreased fetal movements, or contractions.    Her pregnancy is complicated by:  Anemia - resolved.   : Repeat H/H improved 10/34, continue daily PO iron. Hgb 10.7  From Hgb 10.0  in 2024 2.4% retic  HPV pos  Obesity prepreg BMI 31  COVID    Imagin weeks for presentation       Assessment/Plan    Continue prenatal vitamin.  Labs reviewed.  GBS collected  Labor precautions reviewed  Consent signed   Expected mode of delivery TBD  Follow up in 1 week for a routine prenatal visit.  Anemia resolved  Declines flu  Researching RSV    Work excuse given for today's appointment.     Scribe Attestation  By signing my name below, I, Leonardo Plunkett   attest that this documentation has been prepared under the direction and in the presence of Sebas Jay MD.

## 2024-09-03 NOTE — PROGRESS NOTES
Subjective   Patient ID [id number]   Naa Sharif is a 29 y.o.  at 36w5d with a working estimated date of delivery of 2024, by Last Menstrual Period who presents for a routine prenatal visit. She denies vaginal bleeding, leakage of fluid, decreased fetal movements, or contractions.    Her pregnancy is complicated by:  Anemia - resolved.   : Repeat H/H improved 10/34, continue daily PO iron. Hgb 10.7  From Hgb 10.0  in 2024 2.4% retic  HPV pos  Obesity prepreg BMI 31  COVID    Imagin weeks for presentation       Assessment/Plan     Continue prenatal vitamin.  Labs reviewed.  GBS collected  Labor precautions reviewed  Consent signed   Expected mode of delivery TBD  Follow up in 1 week for a routine prenatal visit.  Anemia resolved  Declines flu  Researching RSV    Work excuse given for today's appointment.     Scribe Attestation  By signing my name below, IHalle Scribe   attest that this documentation has been prepared under the direction and in the presence of Sebas Jay MD.

## 2024-09-03 NOTE — LETTER
September 3, 2024     Patient: Naa Sharif   YOB: 1995   Date of Visit: 9/3/2024       To Whom It May Concern:    Naa Sharif was seen in my clinic on 9/3/2024 at 9:15 am. Please excuse Naa for her absence from work on this day to make the appointment.    If you have any questions or concerns, please don't hesitate to call.         Sincerely,         Sebas Jay MD        CC: No Recipients

## 2024-09-06 LAB — GP B STREP GENITAL QL CULT: ABNORMAL

## 2024-09-11 ENCOUNTER — APPOINTMENT (OUTPATIENT)
Dept: OBSTETRICS AND GYNECOLOGY | Facility: CLINIC | Age: 29
End: 2024-09-11
Payer: COMMERCIAL

## 2024-09-11 VITALS — BODY MASS INDEX: 35.26 KG/M2 | DIASTOLIC BLOOD PRESSURE: 70 MMHG | SYSTOLIC BLOOD PRESSURE: 122 MMHG | WEIGHT: 186.6 LBS

## 2024-09-11 DIAGNOSIS — Z34.80 SUPERVISION OF OTHER NORMAL PREGNANCY, ANTEPARTUM (HHS-HCC): Primary | ICD-10-CM

## 2024-09-11 PROCEDURE — 0501F PRENATAL FLOW SHEET: CPT | Performed by: OBSTETRICS & GYNECOLOGY

## 2024-09-11 NOTE — PROGRESS NOTES
Prenatal Visit    Patient presents for routine prenatal visit.   Feeling well.  Baby is moving. No regular contractions. No bleeding. No leaking fluid.    Objective   Physical Exam:   Weight: 84.6 kg (186 lb 9.6 oz)  Expected Total Weight Gain: 5 kg (11 lb)-9 kg (19 lb)   Pregravid BMI: 31.19  BP: 122/70  Fetal Heart Rate: 140 Fundal Height (cm): 38 cm    Dilation: Closed Effacement (%): 50 Fetal Station: -3    Assessment/Plan   1. Supervision of other normal pregnancy, antepartum ([facility])    Doing well today.  Precautions given. Advised her to call for any signs/symptoms of labor or other concerns.  Follow up in 1 weeks.

## 2024-09-17 NOTE — PROGRESS NOTES
Subjective   Patient ID [id number]   Naa Sharif is a 29 y.o.  at 39w0d, +FM, some contractions.    Her pregnancy is complicated by:  Anemia  HPV pos  Pregravid BMI 31.19  COVID    Objective   Physical Exam  Weight: 85.3 kg (188 lb)  BP: 128/76  Fetal Heart Rate: 150 Fundal Height (cm): 37 cm  VE: /-3    Lab Results   Component Value Date    HGB 10.7 (L) 2024    HCT 34.7 (L) 2024       Assessment/Plan   Declines Flu.  Repeat CBC, taking iron but feels tired.  Follow up in Monday for a routine prenatal visit. I offered induction now that she is 39 weeks. Discussed R/B. They will consider and decide by Monday.

## 2024-09-19 ENCOUNTER — LAB (OUTPATIENT)
Dept: LAB | Facility: LAB | Age: 29
End: 2024-09-19
Payer: COMMERCIAL

## 2024-09-19 ENCOUNTER — APPOINTMENT (OUTPATIENT)
Dept: OBSTETRICS AND GYNECOLOGY | Facility: CLINIC | Age: 29
End: 2024-09-19
Payer: COMMERCIAL

## 2024-09-19 VITALS — WEIGHT: 188 LBS | SYSTOLIC BLOOD PRESSURE: 128 MMHG | DIASTOLIC BLOOD PRESSURE: 76 MMHG | BODY MASS INDEX: 35.52 KG/M2

## 2024-09-19 DIAGNOSIS — O99.013 ANEMIA OF PREGNANCY, THIRD TRIMESTER (HHS-HCC): Primary | ICD-10-CM

## 2024-09-19 DIAGNOSIS — Z34.80 SUPERVISION OF OTHER NORMAL PREGNANCY, ANTEPARTUM (HHS-HCC): ICD-10-CM

## 2024-09-19 DIAGNOSIS — O99.013 ANEMIA OF PREGNANCY, THIRD TRIMESTER (HHS-HCC): ICD-10-CM

## 2024-09-19 LAB
ERYTHROCYTE [DISTWIDTH] IN BLOOD BY AUTOMATED COUNT: 15.5 % (ref 11.5–14.5)
HCT VFR BLD AUTO: 34 % (ref 36–46)
HGB BLD-MCNC: 10.8 G/DL (ref 12–16)
MCH RBC QN AUTO: 30.2 PG (ref 26–34)
MCHC RBC AUTO-ENTMCNC: 31.8 G/DL (ref 32–36)
MCV RBC AUTO: 95 FL (ref 80–100)
NRBC BLD-RTO: 0 /100 WBCS (ref 0–0)
PLATELET # BLD AUTO: 165 X10*3/UL (ref 150–450)
POC GLUCOSE, URINE: NEGATIVE MG/DL
POC PROTEIN, URINE: ABNORMAL MG/DL
RBC # BLD AUTO: 3.58 X10*6/UL (ref 4–5.2)
WBC # BLD AUTO: 9.9 X10*3/UL (ref 4.4–11.3)

## 2024-09-19 PROCEDURE — 0501F PRENATAL FLOW SHEET: CPT | Performed by: OBSTETRICS & GYNECOLOGY

## 2024-09-19 PROCEDURE — 36415 COLL VENOUS BLD VENIPUNCTURE: CPT

## 2024-09-19 PROCEDURE — 85027 COMPLETE CBC AUTOMATED: CPT

## 2024-09-21 ENCOUNTER — HOSPITAL ENCOUNTER (INPATIENT)
Facility: HOSPITAL | Age: 29
LOS: 1 days | Discharge: HOME | End: 2024-09-22
Attending: OBSTETRICS & GYNECOLOGY | Admitting: OBSTETRICS & GYNECOLOGY
Payer: COMMERCIAL

## 2024-09-21 ENCOUNTER — ANESTHESIA (OUTPATIENT)
Dept: OBSTETRICS AND GYNECOLOGY | Facility: HOSPITAL | Age: 29
End: 2024-09-21
Payer: COMMERCIAL

## 2024-09-21 ENCOUNTER — ANESTHESIA EVENT (OUTPATIENT)
Dept: OBSTETRICS AND GYNECOLOGY | Facility: HOSPITAL | Age: 29
End: 2024-09-21
Payer: COMMERCIAL

## 2024-09-21 PROBLEM — J06.9 URI, ACUTE: Status: RESOLVED | Noted: 2024-06-24 | Resolved: 2024-09-21

## 2024-09-21 PROBLEM — Z34.90 TERM PREGNANCY (HHS-HCC): Status: ACTIVE | Noted: 2024-09-21

## 2024-09-21 PROBLEM — Z34.80 SUPERVISION OF OTHER NORMAL PREGNANCY, ANTEPARTUM (HHS-HCC): Status: RESOLVED | Noted: 2024-06-24 | Resolved: 2024-09-21

## 2024-09-21 PROBLEM — N89.8 VAGINAL DISCHARGE: Status: RESOLVED | Noted: 2024-06-24 | Resolved: 2024-09-21

## 2024-09-21 LAB
ABO GROUP (TYPE) IN BLOOD: NORMAL
ANTIBODY SCREEN: NORMAL
ERYTHROCYTE [DISTWIDTH] IN BLOOD BY AUTOMATED COUNT: 15.3 % (ref 11.5–14.5)
HCT VFR BLD AUTO: 32.6 % (ref 36–46)
HGB BLD-MCNC: 10.8 G/DL (ref 12–16)
MCH RBC QN AUTO: 30.3 PG (ref 26–34)
MCHC RBC AUTO-ENTMCNC: 33.1 G/DL (ref 32–36)
MCV RBC AUTO: 92 FL (ref 80–100)
NRBC BLD-RTO: 0 /100 WBCS (ref 0–0)
PLATELET # BLD AUTO: 152 X10*3/UL (ref 150–450)
RBC # BLD AUTO: 3.56 X10*6/UL (ref 4–5.2)
RH FACTOR (ANTIGEN D): NORMAL
WBC # BLD AUTO: 9.6 X10*3/UL (ref 4.4–11.3)

## 2024-09-21 PROCEDURE — 7100000016 HC LABOR RECOVERY PER HOUR

## 2024-09-21 PROCEDURE — 2500000001 HC RX 250 WO HCPCS SELF ADMINISTERED DRUGS (ALT 637 FOR MEDICARE OP): Performed by: OBSTETRICS & GYNECOLOGY

## 2024-09-21 PROCEDURE — 0HQ9XZZ REPAIR PERINEUM SKIN, EXTERNAL APPROACH: ICD-10-PCS | Performed by: OBSTETRICS & GYNECOLOGY

## 2024-09-21 PROCEDURE — 59400 OBSTETRICAL CARE: CPT | Performed by: OBSTETRICS & GYNECOLOGY

## 2024-09-21 PROCEDURE — 2500000004 HC RX 250 GENERAL PHARMACY W/ HCPCS (ALT 636 FOR OP/ED): Performed by: NURSE ANESTHETIST, CERTIFIED REGISTERED

## 2024-09-21 PROCEDURE — 10907ZC DRAINAGE OF AMNIOTIC FLUID, THERAPEUTIC FROM PRODUCTS OF CONCEPTION, VIA NATURAL OR ARTIFICIAL OPENING: ICD-10-PCS | Performed by: OBSTETRICS & GYNECOLOGY

## 2024-09-21 PROCEDURE — 36415 COLL VENOUS BLD VENIPUNCTURE: CPT | Performed by: OBSTETRICS & GYNECOLOGY

## 2024-09-21 PROCEDURE — 2500000004 HC RX 250 GENERAL PHARMACY W/ HCPCS (ALT 636 FOR OP/ED): Performed by: OBSTETRICS & GYNECOLOGY

## 2024-09-21 PROCEDURE — 85027 COMPLETE CBC AUTOMATED: CPT | Performed by: OBSTETRICS & GYNECOLOGY

## 2024-09-21 PROCEDURE — 3700000014 EPIDURAL BLOCK: Performed by: NURSE ANESTHETIST, CERTIFIED REGISTERED

## 2024-09-21 PROCEDURE — 59050 FETAL MONITOR W/REPORT: CPT

## 2024-09-21 PROCEDURE — 86901 BLOOD TYPING SEROLOGIC RH(D): CPT | Performed by: OBSTETRICS & GYNECOLOGY

## 2024-09-21 PROCEDURE — 2500000005 HC RX 250 GENERAL PHARMACY W/O HCPCS: Performed by: OBSTETRICS & GYNECOLOGY

## 2024-09-21 PROCEDURE — 7210000002 HC LABOR PER HOUR

## 2024-09-21 PROCEDURE — 1120000001 HC OB PRIVATE ROOM DAILY

## 2024-09-21 PROCEDURE — 59409 OBSTETRICAL CARE: CPT | Performed by: OBSTETRICS & GYNECOLOGY

## 2024-09-21 PROCEDURE — 86780 TREPONEMA PALLIDUM: CPT | Mod: GEALAB | Performed by: OBSTETRICS & GYNECOLOGY

## 2024-09-21 PROCEDURE — 51701 INSERT BLADDER CATHETER: CPT

## 2024-09-21 PROCEDURE — 51702 INSERT TEMP BLADDER CATH: CPT

## 2024-09-21 RX ORDER — TRANEXAMIC ACID 100 MG/ML
1000 INJECTION, SOLUTION INTRAVENOUS ONCE AS NEEDED
Status: DISCONTINUED | OUTPATIENT
Start: 2024-09-21 | End: 2024-09-21

## 2024-09-21 RX ORDER — OXYTOCIN/0.9 % SODIUM CHLORIDE 30/500 ML
60 PLASTIC BAG, INJECTION (ML) INTRAVENOUS ONCE AS NEEDED
Status: DISCONTINUED | OUTPATIENT
Start: 2024-09-21 | End: 2024-09-22 | Stop reason: HOSPADM

## 2024-09-21 RX ORDER — OXYTOCIN 10 [USP'U]/ML
10 INJECTION, SOLUTION INTRAMUSCULAR; INTRAVENOUS ONCE AS NEEDED
Status: DISCONTINUED | OUTPATIENT
Start: 2024-09-21 | End: 2024-09-22 | Stop reason: HOSPADM

## 2024-09-21 RX ORDER — METOCLOPRAMIDE 10 MG/1
10 TABLET ORAL EVERY 6 HOURS PRN
Status: DISCONTINUED | OUTPATIENT
Start: 2024-09-21 | End: 2024-09-21

## 2024-09-21 RX ORDER — OXYTOCIN 10 [USP'U]/ML
10 INJECTION, SOLUTION INTRAMUSCULAR; INTRAVENOUS ONCE AS NEEDED
Status: DISCONTINUED | OUTPATIENT
Start: 2024-09-21 | End: 2024-09-21

## 2024-09-21 RX ORDER — LIDOCAINE HYDROCHLORIDE 10 MG/ML
30 INJECTION, SOLUTION INFILTRATION; PERINEURAL ONCE AS NEEDED
Status: DISCONTINUED | OUTPATIENT
Start: 2024-09-21 | End: 2024-09-21

## 2024-09-21 RX ORDER — NIFEDIPINE 10 MG/1
10 CAPSULE ORAL ONCE AS NEEDED
Status: DISCONTINUED | OUTPATIENT
Start: 2024-09-21 | End: 2024-09-21

## 2024-09-21 RX ORDER — OXYTOCIN/0.9 % SODIUM CHLORIDE 30/500 ML
2-30 PLASTIC BAG, INJECTION (ML) INTRAVENOUS CONTINUOUS
Status: DISCONTINUED | OUTPATIENT
Start: 2024-09-21 | End: 2024-09-22 | Stop reason: HOSPADM

## 2024-09-21 RX ORDER — OXYTOCIN/0.9 % SODIUM CHLORIDE 30/500 ML
60 PLASTIC BAG, INJECTION (ML) INTRAVENOUS ONCE AS NEEDED
Status: DISCONTINUED | OUTPATIENT
Start: 2024-09-21 | End: 2024-09-21

## 2024-09-21 RX ORDER — ADHESIVE BANDAGE
10 BANDAGE TOPICAL
Status: DISCONTINUED | OUTPATIENT
Start: 2024-09-21 | End: 2024-09-22 | Stop reason: HOSPADM

## 2024-09-21 RX ORDER — ONDANSETRON 4 MG/1
4 TABLET, FILM COATED ORAL EVERY 6 HOURS PRN
Status: DISCONTINUED | OUTPATIENT
Start: 2024-09-21 | End: 2024-09-21

## 2024-09-21 RX ORDER — SODIUM CHLORIDE, SODIUM LACTATE, POTASSIUM CHLORIDE, CALCIUM CHLORIDE 600; 310; 30; 20 MG/100ML; MG/100ML; MG/100ML; MG/100ML
125 INJECTION, SOLUTION INTRAVENOUS CONTINUOUS
Status: DISCONTINUED | OUTPATIENT
Start: 2024-09-21 | End: 2024-09-21

## 2024-09-21 RX ORDER — TERBUTALINE SULFATE 1 MG/ML
0.25 INJECTION SUBCUTANEOUS ONCE AS NEEDED
Status: DISCONTINUED | OUTPATIENT
Start: 2024-09-21 | End: 2024-09-21

## 2024-09-21 RX ORDER — LOPERAMIDE HYDROCHLORIDE 2 MG/1
4 CAPSULE ORAL EVERY 2 HOUR PRN
Status: DISCONTINUED | OUTPATIENT
Start: 2024-09-21 | End: 2024-09-21

## 2024-09-21 RX ORDER — ONDANSETRON HYDROCHLORIDE 2 MG/ML
4 INJECTION, SOLUTION INTRAVENOUS EVERY 6 HOURS PRN
Status: DISCONTINUED | OUTPATIENT
Start: 2024-09-21 | End: 2024-09-21

## 2024-09-21 RX ORDER — CARBOPROST TROMETHAMINE 250 UG/ML
250 INJECTION, SOLUTION INTRAMUSCULAR ONCE AS NEEDED
Status: DISCONTINUED | OUTPATIENT
Start: 2024-09-21 | End: 2024-09-21

## 2024-09-21 RX ORDER — PHENYLEPHRINE HCL IN 0.9% NACL 0.4MG/10ML
SYRINGE (ML) INTRAVENOUS AS NEEDED
Status: DISCONTINUED | OUTPATIENT
Start: 2024-09-21 | End: 2024-09-21

## 2024-09-21 RX ORDER — LABETALOL HYDROCHLORIDE 5 MG/ML
20 INJECTION, SOLUTION INTRAVENOUS ONCE AS NEEDED
Status: DISCONTINUED | OUTPATIENT
Start: 2024-09-21 | End: 2024-09-22 | Stop reason: HOSPADM

## 2024-09-21 RX ORDER — MISOPROSTOL 200 UG/1
800 TABLET ORAL ONCE AS NEEDED
Status: DISCONTINUED | OUTPATIENT
Start: 2024-09-21 | End: 2024-09-21

## 2024-09-21 RX ORDER — ENOXAPARIN SODIUM 100 MG/ML
40 INJECTION SUBCUTANEOUS EVERY 24 HOURS
Status: DISCONTINUED | OUTPATIENT
Start: 2024-09-22 | End: 2024-09-22 | Stop reason: HOSPADM

## 2024-09-21 RX ORDER — METHYLERGONOVINE MALEATE 0.2 MG/ML
0.2 INJECTION INTRAVENOUS ONCE AS NEEDED
Status: DISCONTINUED | OUTPATIENT
Start: 2024-09-21 | End: 2024-09-22 | Stop reason: HOSPADM

## 2024-09-21 RX ORDER — HYDRALAZINE HYDROCHLORIDE 20 MG/ML
5 INJECTION INTRAMUSCULAR; INTRAVENOUS ONCE AS NEEDED
Status: DISCONTINUED | OUTPATIENT
Start: 2024-09-21 | End: 2024-09-22 | Stop reason: HOSPADM

## 2024-09-21 RX ORDER — DIPHENHYDRAMINE HCL 25 MG
25 CAPSULE ORAL EVERY 6 HOURS PRN
Status: DISCONTINUED | OUTPATIENT
Start: 2024-09-21 | End: 2024-09-22 | Stop reason: HOSPADM

## 2024-09-21 RX ORDER — LABETALOL HYDROCHLORIDE 5 MG/ML
20 INJECTION, SOLUTION INTRAVENOUS ONCE AS NEEDED
Status: DISCONTINUED | OUTPATIENT
Start: 2024-09-21 | End: 2024-09-21

## 2024-09-21 RX ORDER — METHYLERGONOVINE MALEATE 0.2 MG/ML
0.2 INJECTION INTRAVENOUS ONCE AS NEEDED
Status: DISCONTINUED | OUTPATIENT
Start: 2024-09-21 | End: 2024-09-21

## 2024-09-21 RX ORDER — LIDOCAINE 560 MG/1
1 PATCH PERCUTANEOUS; TOPICAL; TRANSDERMAL
Status: DISCONTINUED | OUTPATIENT
Start: 2024-09-21 | End: 2024-09-22 | Stop reason: HOSPADM

## 2024-09-21 RX ORDER — METOCLOPRAMIDE HYDROCHLORIDE 5 MG/ML
10 INJECTION INTRAMUSCULAR; INTRAVENOUS EVERY 6 HOURS PRN
Status: DISCONTINUED | OUTPATIENT
Start: 2024-09-21 | End: 2024-09-21

## 2024-09-21 RX ORDER — NIFEDIPINE 10 MG/1
10 CAPSULE ORAL ONCE AS NEEDED
Status: DISCONTINUED | OUTPATIENT
Start: 2024-09-21 | End: 2024-09-22 | Stop reason: HOSPADM

## 2024-09-21 RX ORDER — FENTANYL/ROPIVACAINE/NS/PF 2MCG/ML-.2
0-25 PLASTIC BAG, INJECTION (ML) INJECTION CONTINUOUS
Status: DISCONTINUED | OUTPATIENT
Start: 2024-09-21 | End: 2024-09-22 | Stop reason: HOSPADM

## 2024-09-21 RX ORDER — MISOPROSTOL 200 UG/1
800 TABLET ORAL ONCE AS NEEDED
Status: DISCONTINUED | OUTPATIENT
Start: 2024-09-21 | End: 2024-09-22 | Stop reason: HOSPADM

## 2024-09-21 RX ORDER — IBUPROFEN 600 MG/1
600 TABLET ORAL EVERY 6 HOURS
Status: DISCONTINUED | OUTPATIENT
Start: 2024-09-21 | End: 2024-09-22 | Stop reason: HOSPADM

## 2024-09-21 RX ORDER — DIPHENHYDRAMINE HYDROCHLORIDE 50 MG/ML
25 INJECTION INTRAMUSCULAR; INTRAVENOUS EVERY 6 HOURS PRN
Status: DISCONTINUED | OUTPATIENT
Start: 2024-09-21 | End: 2024-09-22 | Stop reason: HOSPADM

## 2024-09-21 RX ORDER — BISACODYL 10 MG/1
10 SUPPOSITORY RECTAL DAILY PRN
Status: DISCONTINUED | OUTPATIENT
Start: 2024-09-21 | End: 2024-09-22 | Stop reason: HOSPADM

## 2024-09-21 RX ORDER — TRANEXAMIC ACID 100 MG/ML
1000 INJECTION, SOLUTION INTRAVENOUS ONCE AS NEEDED
Status: DISCONTINUED | OUTPATIENT
Start: 2024-09-21 | End: 2024-09-22 | Stop reason: HOSPADM

## 2024-09-21 RX ORDER — ACETAMINOPHEN 325 MG/1
975 TABLET ORAL EVERY 6 HOURS
Status: DISCONTINUED | OUTPATIENT
Start: 2024-09-21 | End: 2024-09-22 | Stop reason: HOSPADM

## 2024-09-21 RX ORDER — POLYETHYLENE GLYCOL 3350 17 G/17G
17 POWDER, FOR SOLUTION ORAL 2 TIMES DAILY PRN
Status: DISCONTINUED | OUTPATIENT
Start: 2024-09-21 | End: 2024-09-22 | Stop reason: HOSPADM

## 2024-09-21 RX ORDER — FENTANYL/ROPIVACAINE/NS/PF 2MCG/ML-.2
PLASTIC BAG, INJECTION (ML) INJECTION
Status: DISCONTINUED
Start: 2024-09-21 | End: 2024-09-21 | Stop reason: WASHOUT

## 2024-09-21 RX ORDER — ONDANSETRON HYDROCHLORIDE 2 MG/ML
4 INJECTION, SOLUTION INTRAVENOUS EVERY 6 HOURS PRN
Status: DISCONTINUED | OUTPATIENT
Start: 2024-09-21 | End: 2024-09-22 | Stop reason: HOSPADM

## 2024-09-21 RX ORDER — ONDANSETRON 4 MG/1
4 TABLET, FILM COATED ORAL EVERY 6 HOURS PRN
Status: DISCONTINUED | OUTPATIENT
Start: 2024-09-21 | End: 2024-09-22 | Stop reason: HOSPADM

## 2024-09-21 RX ORDER — LOPERAMIDE HYDROCHLORIDE 2 MG/1
4 CAPSULE ORAL EVERY 2 HOUR PRN
Status: DISCONTINUED | OUTPATIENT
Start: 2024-09-21 | End: 2024-09-22 | Stop reason: HOSPADM

## 2024-09-21 RX ORDER — SIMETHICONE 80 MG
80 TABLET,CHEWABLE ORAL 4 TIMES DAILY PRN
Status: DISCONTINUED | OUTPATIENT
Start: 2024-09-21 | End: 2024-09-22 | Stop reason: HOSPADM

## 2024-09-21 RX ORDER — CARBOPROST TROMETHAMINE 250 UG/ML
250 INJECTION, SOLUTION INTRAMUSCULAR ONCE AS NEEDED
Status: DISCONTINUED | OUTPATIENT
Start: 2024-09-21 | End: 2024-09-22 | Stop reason: HOSPADM

## 2024-09-21 RX ORDER — HYDRALAZINE HYDROCHLORIDE 20 MG/ML
5 INJECTION INTRAMUSCULAR; INTRAVENOUS ONCE AS NEEDED
Status: DISCONTINUED | OUTPATIENT
Start: 2024-09-21 | End: 2024-09-21

## 2024-09-21 SDOH — HEALTH STABILITY: MENTAL HEALTH: CURRENT SMOKER: 0

## 2024-09-21 SDOH — HEALTH STABILITY: MENTAL HEALTH: SUICIDAL BEHAVIOR (LIFETIME): NO

## 2024-09-21 SDOH — SOCIAL STABILITY: SOCIAL INSECURITY: HAS ANYONE EVER THREATENED TO HURT YOUR FAMILY OR YOUR PETS?: NO

## 2024-09-21 SDOH — SOCIAL STABILITY: SOCIAL INSECURITY: ARE THERE ANY APPARENT SIGNS OF INJURIES/BEHAVIORS THAT COULD BE RELATED TO ABUSE/NEGLECT?: NO

## 2024-09-21 SDOH — SOCIAL STABILITY: SOCIAL INSECURITY: HAVE YOU HAD THOUGHTS OF HARMING ANYONE ELSE?: NO

## 2024-09-21 SDOH — HEALTH STABILITY: MENTAL HEALTH: WISH TO BE DEAD (PAST 1 MONTH): NO

## 2024-09-21 SDOH — SOCIAL STABILITY: SOCIAL INSECURITY: VERBAL ABUSE: DENIES

## 2024-09-21 SDOH — SOCIAL STABILITY: SOCIAL INSECURITY: DOES ANYONE TRY TO KEEP YOU FROM HAVING/CONTACTING OTHER FRIENDS OR DOING THINGS OUTSIDE YOUR HOME?: NO

## 2024-09-21 SDOH — SOCIAL STABILITY: SOCIAL INSECURITY: ARE YOU OR HAVE YOU BEEN THREATENED OR ABUSED PHYSICALLY, EMOTIONALLY, OR SEXUALLY BY ANYONE?: NO

## 2024-09-21 SDOH — SOCIAL STABILITY: SOCIAL INSECURITY: DO YOU FEEL ANYONE HAS EXPLOITED OR TAKEN ADVANTAGE OF YOU FINANCIALLY OR OF YOUR PERSONAL PROPERTY?: NO

## 2024-09-21 SDOH — HEALTH STABILITY: MENTAL HEALTH: NON-SPECIFIC ACTIVE SUICIDAL THOUGHTS (PAST 1 MONTH): NO

## 2024-09-21 SDOH — SOCIAL STABILITY: SOCIAL INSECURITY: PHYSICAL ABUSE: DENIES

## 2024-09-21 SDOH — ECONOMIC STABILITY: HOUSING INSECURITY: DO YOU FEEL UNSAFE GOING BACK TO THE PLACE WHERE YOU ARE LIVING?: NO

## 2024-09-21 SDOH — SOCIAL STABILITY: SOCIAL INSECURITY: ABUSE SCREEN: ADULT

## 2024-09-21 ASSESSMENT — LIFESTYLE VARIABLES
HOW OFTEN DO YOU HAVE A DRINK CONTAINING ALCOHOL: NEVER
HOW MANY STANDARD DRINKS CONTAINING ALCOHOL DO YOU HAVE ON A TYPICAL DAY: PATIENT DOES NOT DRINK
AUDIT-C TOTAL SCORE: 0
AUDIT-C TOTAL SCORE: 0
SKIP TO QUESTIONS 9-10: 1
HOW OFTEN DO YOU HAVE 6 OR MORE DRINKS ON ONE OCCASION: NEVER

## 2024-09-21 ASSESSMENT — PAIN SCALES - GENERAL
PAINLEVEL_OUTOF10: 0 - NO PAIN
PAINLEVEL_OUTOF10: 2
PAINLEVEL_OUTOF10: 0 - NO PAIN
PAINLEVEL_OUTOF10: 4
PAINLEVEL_OUTOF10: 0 - NO PAIN
PAINLEVEL_OUTOF10: 5 - MODERATE PAIN
PAINLEVEL_OUTOF10: 0 - NO PAIN
PAINLEVEL_OUTOF10: 7
PAINLEVEL_OUTOF10: 0 - NO PAIN
PAINLEVEL_OUTOF10: 7
PAINLEVEL_OUTOF10: 0 - NO PAIN
PAIN_LEVEL: 2
PAINLEVEL_OUTOF10: 5 - MODERATE PAIN
PAINLEVEL_OUTOF10: 6
PAINLEVEL_OUTOF10: 0 - NO PAIN

## 2024-09-21 ASSESSMENT — PAIN DESCRIPTION - LOCATION: LOCATION: ABDOMEN

## 2024-09-21 ASSESSMENT — PAIN DESCRIPTION - DESCRIPTORS
DESCRIPTORS: CRAMPING
DESCRIPTORS: CRAMPING

## 2024-09-21 ASSESSMENT — ACTIVITIES OF DAILY LIVING (ADL): LACK_OF_TRANSPORTATION: NO

## 2024-09-21 ASSESSMENT — PATIENT HEALTH QUESTIONNAIRE - PHQ9
2. FEELING DOWN, DEPRESSED OR HOPELESS: NOT AT ALL
SUM OF ALL RESPONSES TO PHQ9 QUESTIONS 1 & 2: 0
1. LITTLE INTEREST OR PLEASURE IN DOING THINGS: NOT AT ALL

## 2024-09-21 NOTE — L&D DELIVERY NOTE
OB Delivery Note  2024  Naa Sharif  29 y.o.   Vaginal, Spontaneous       Gestational Age: 39w2d  /Para:   Quantitative Blood Loss: Admission to Discharge: 0 mL (2024  5:46 AM - 2024  3:45 PM)    Lashae Sharif [[id number]]      Labor Events    Sac identifier: Sac 1  Rupture date/time: 2024 1100  Rupture type: Artificial  Fluid color: Meconium  Fluid odor: None  Labor type: Spontaneous Onset of Labor  Labor allowed to proceed with plans for an attempted vaginal birth?: Yes  Augmentation: AROM, Oxytocin  Augmentation indications: Ineffective Contraction Pattern  Complications: None       Placenta    Placenta delivery date/time: 2024 1511  Placenta removal: Spontaneous  Placenta appearance: Intact  Placenta disposition: discarded       Cord    Vessels: 3 vessels  Cord blood disposition: Refrigerator  Gases sent?: No       Lacerations    Perineal laceration: 1st  Other lacerations?: No  Repair suture: 3-0 Synthetic Suture       Anesthesia    Method: Epidural       Operative Delivery    Forceps attempted?: No  Vacuum extractor attempted?: No       Shoulder Dystocia    Shoulder dystocia present?: No       [place] Delivery    Birth date/time: 2024 15:08:00  Delivery type: Vaginal, Spontaneous  Complications: None       Resuscitation    Method: Suctioning, Tactile stimulation       Apgars    Living status: Living  Apgar Component Scores:  1 min.:  5 min.:  10 min.:  15 min.:  20 min.:    Skin color:  1  1       Heart rate:  2  2       Reflex irritability:  2  2       Muscle tone:  2  2       Respiratory effort:  2  2       Total:  9  9       Apgars assigned by: JULES GARCIA       Delivery Providers    Delivering clinician: Binta López MD   Provider Role    Effie Cordero, RN Delivery Nurse    Leilani Urban, RN Nursery Nurse     Resident                   The patient progressed to complete cervical dilation. The patient pushed for 5 mins hours and delivered by .  Following delivery the infant was placed on the mother's chest for skin to skin care. Delayed cord clamping was done for 30 seconds, during which time the infant's nose and mouth were suctioned. The cord was clamped and cut. The placenta delivered spontaneously. A 1st degree laceration was noted and repaired using 3.0 polysorb suture in the usual fashion. Hemostasis of the laceration was noted. The fundus was expressed and found to be firm and below the umbilicus. Postpartum bleeding was found to be appropriate.       Binta López MD

## 2024-09-21 NOTE — ANESTHESIA PROCEDURE NOTES
Epidural Block    Start time: 9/21/2024 7:50 AM  End time: 9/21/2024 8:20 AM  Reason for block: labor analgesia  Staffing  Performed: CRNA   Authorized by: NÉSTOR Flanagan    Performed by: NÉSTOR Flanagan    Preanesthetic Checklist  Completed: patient identified, IV checked, risks and benefits discussed, surgical consent, pre-op evaluation, timeout performed and sterile techniques followed  Block Timeout  RN/Licensed healthcare professional reads aloud to the Anesthesia provider and entire team: Patient identity, procedure with side and site, patient position, and as applicable the availability of implants/special equipment/special requirements.  Patient on coagulant treatment: yes  Timeout performed at: 9/21/2024 7:53 AM  Block Placement  Patient position: sitting  Prep: DuraPrep  Sterility prep: cap, drape, gloves, hand and mask  Sedation level: no sedation  Patient monitoring: heart rate, EKG, continuous pulse oximetry and blood pressure  Approach: midline  Local numbing: lidocaine 1% to skin and subcutaneous tissues  Vertebral space: lumbar  Lumbar location: L3-L4  Epidural  Loss of resistance technique: saline  Guidance: landmark technique        Needle  Needle type: Tuohy   Needle gauge: 17  Needle length: 8.9cm  Needle insertion depth: 7 cm  Catheter type: multi-orifice  Catheter size: 19 G  Catheter at skin depth: 15 cm  Catheter securement method: clear occlusive dressing and surgical tape    Test dose: lidocaine 1.5% with epinephrine 1-to-200,000  Test dose: lidocaine 1.5% with epinephrine 1-to-200,000  Test dose result: no positive test dose            Assessment  Sensory level: T4 bilateral  Block outcome: patient comfortable  Number of attempts: 1  Events: no positive test dose  Procedure assessment: patient tolerated procedure well with no immediate complications

## 2024-09-21 NOTE — PROGRESS NOTES
Intrapartum Progress Note    Subjective     Comfortable with epidural.     Objective   Last Vitals:  /58 Temp 36.8 °C (98.2 °F) Pulse 85     Tracing: fhr 130s, accelerations, variables with contractions to 90 bpm  Ctx: every 2-3 mins.     VE: 4cm/90%/-1/vtx  AROM for thick MSF.     S/p AROM and emptying of bladder for 600 + ml urine, pt feeling pressure.   Cervix unchanged at 4cm.     Position change.     Binta López MD

## 2024-09-21 NOTE — H&P
Note Type:  OB Admission H&P    ASSESSMENT & PLAN: Naa Sharif is a 29 y.o.  at 39w2d who is admitted for Labor    Plan:    -Admit to L&D, consented,  cephalic based on: cervical exam  -Labs drawn:  T&S, CBC, and Syphilis  - GBS +, PCN allergic  -Epidural at patient request  -Recheck as clinically indicated by maternal or fetal status    Fetal Status  -NST reactive, reassuring   -EFW 7#5oz by leopolds  -1hr 97  -GBS POS, Sensitivities not tested    Pregnancy Problems (from 24 to present)       Problem Noted Resolved    Term pregnancy ([facility]) 2024 by Binta López MD No    Priority:  Medium      Supervision of other normal pregnancy, antepartum ([facility]) 2024 by Sebas Jay MD No    Priority:  Medium      Obesity during pregnancy ([facility]) 3/4/2024 by BRADLEY German-CNP No    Priority:  Medium      Anemia affecting pregnancy in third trimester ([facility]) 2024 by BRADLEY German-CNP No    Priority:  Medium              Subjective   28 yo  39 2/7 weeks c/o contractions. No LOF  Pregnancy uncomplicated.  GBS+, PCN allergy, sensitivities not tested.  Good fetal movement.  Denies vaginal bleeding.  Covid in  during pregnancy.  Obesit BMI 36.    Prenatal Provider GWS    OB History    Para Term  AB Living   2 1 1 0 0 1   SAB IAB Ectopic Multiple Live Births   0 0 0 0 1      # Outcome Date GA Lbr Freddy/2nd Weight Sex Type Anes PTL Lv   2 Current            1 Term 21 37w0d  2.948 kg M Vag-Spont EPI Y MIKE       Past Surgical History:   Procedure Laterality Date    OTHER SURGICAL HISTORY  2020    No history of surgery       Social History     Tobacco Use    Smoking status: Never    Smokeless tobacco: Never   Substance Use Topics    Alcohol use: Not Currently       Allergies   Allergen Reactions    Penicillins Anaphylaxis, Rash and Shortness of breath       Medications Prior to Admission   Medication Sig Dispense Refill Last Dose     famotidine (Pepcid) 10 mg tablet Take by mouth.   Past Week    ferrous sulfate 325 (65 Fe) MG EC tablet Take 65 mg by mouth once daily with breakfast. Do not crush, chew, or split.   9/20/2024    prenatal19-iron-folic-omega3 29-1-400 mg combo pack,tablet and cap,DR Take by mouth.   9/20/2024     Objective     Last Vitals  Temp Pulse Resp BP MAP O2 Sat   36.8 °C (98.2 °F) 86 18 110/64 80 96 %     Blood Pressures         9/21/2024  0607             BP: 110/64             Physical Exam  General: NAD, mood appropriate  Cardiopulmonary: warm and well perfused, breathing comfortably on room air  Abdomen: Gravid, non-tender  Extremities: full range of motion  Cervix: 3 /70 /-2      Fetal Monitoring  Baseline: 130s bpm, Variability: moderate,  Accelerations: present and Decelerations: none    Uterine Activity: Contractions present every 2-3 mins    Interpretation: Reactive    Labs in chart were reviewed.  0   Lab Value Date/Time    GRPBSTREP (A) 09/03/2024 0949     Isolated: Streptococcus agalactiae (Group B Streptococcus)      Results from last 7 days   Lab Units 09/19/24  1528   WBC AUTO x10*3/uL 9.9   HEMOGLOBIN g/dL 10.8*   HEMATOCRIT % 34.0*   PLATELETS AUTO x10*3/uL 165        Prenatal labs reviewed, not remarkable.      Binta López MD

## 2024-09-21 NOTE — PROGRESS NOTES
Intrapartum Progress Note    Late entry from 1330h    Subjective   Pt with increased pain, received bolus    Objective   Last Vitals:  BP 99/56 Temp 36.8 °C (98.2 °F) Pulse 77     Tracing: cat 1 tracing, intermittent variables  Ctx: every 2-3 mins  Pit 10 mu    VE: 6cm/100%/-1/vtx, more anterior    Binta López MD

## 2024-09-21 NOTE — CARE PLAN
The patient's goals for the shift include have a healthy baby    The clinical goals for the shift include vaginal birth of viable infant    Over the shift, the patient did not make progress toward the following goals. Barriers to progression include none. Recommendations to address these barriers include none.

## 2024-09-21 NOTE — ANESTHESIA POSTPROCEDURE EVALUATION
Patient: Naa Sharif    Procedure Summary       Date: 09/21/24 Room / Location:     Anesthesia Start: 0750 Anesthesia Stop: 1508    Procedure: Labor Analgesia Diagnosis:     Scheduled Providers:  Responsible Provider: NÉSTOR Flanagan    Anesthesia Type: epidural ASA Status: 2            Anesthesia Type: epidural    Vitals Value Taken Time   BP  09/21/24 1820   Temp  09/21/24 1820   Pulse  09/21/24 1820   Resp  09/21/24 1820   SpO2  09/21/24 1820       Anesthesia Post Evaluation    Patient location during evaluation: bedside  Patient participation: complete - patient participated  Level of consciousness: awake and alert  Pain score: 2  Pain management: adequate  Multimodal analgesia pain management approach  Airway patency: patent  Two or more strategies used to mitigate risk of obstructive sleep apnea  Cardiovascular status: acceptable  Respiratory status: acceptable  Hydration status: acceptable  Postoperative Nausea and Vomiting: none        No notable events documented.

## 2024-09-21 NOTE — ANESTHESIA PREPROCEDURE EVALUATION
Patient: Naa Sharif    Evaluation Method: In-person visit    Procedure Information    Date: 09/21/24  Procedure: Labor Analgesia         Relevant Problems   Endocrine   (+) Obesity during pregnancy ([facility])      Hematology   (+) Anemia affecting pregnancy in third trimester ([facility])      ID   (+) URI, acute      GYN   (+) Supervision of other normal pregnancy, antepartum ([facility])       Clinical information reviewed:   Tobacco  Allergies  Meds   Med Hx  Surg Hx   Fam Hx          NPO Detail:  No data recorded     OB/GYN     Physical Exam    Airway  Mallampati: II  TM distance: >3 FB  Neck ROM: full     Cardiovascular - normal exam  Rhythm: regular  Rate: normal     Dental - normal exam     Pulmonary - normal exam     Abdominal - normal exam             Anesthesia Plan    History of general anesthesia?: no  History of complications of general anesthesia?: no    ASA 2     epidural     The patient is not a current smoker.  Patient was not previously instructed to abstain from smoking on day of procedure.  Patient did not smoke on day of procedure.    Anesthetic plan and risks discussed with patient.  Use of blood products discussed with patient who.    Plan discussed with CRNA and attending.

## 2024-09-22 VITALS
OXYGEN SATURATION: 98 % | DIASTOLIC BLOOD PRESSURE: 70 MMHG | SYSTOLIC BLOOD PRESSURE: 105 MMHG | RESPIRATION RATE: 16 BRPM | WEIGHT: 189.04 LBS | TEMPERATURE: 98.6 F | HEIGHT: 60 IN | BODY MASS INDEX: 37.11 KG/M2 | HEART RATE: 71 BPM

## 2024-09-22 PROBLEM — O99.210 OBESITY DURING PREGNANCY (HHS-HCC): Status: RESOLVED | Noted: 2024-03-04 | Resolved: 2024-09-22

## 2024-09-22 PROBLEM — O99.013 ANEMIA AFFECTING PREGNANCY IN THIRD TRIMESTER (HHS-HCC): Status: RESOLVED | Noted: 2024-02-19 | Resolved: 2024-09-22

## 2024-09-22 PROBLEM — Z34.90 TERM PREGNANCY (HHS-HCC): Status: RESOLVED | Noted: 2024-09-21 | Resolved: 2024-09-22

## 2024-09-22 LAB — TREPONEMA PALLIDUM IGG+IGM AB [PRESENCE] IN SERUM OR PLASMA BY IMMUNOASSAY: NONREACTIVE

## 2024-09-22 PROCEDURE — 2500000001 HC RX 250 WO HCPCS SELF ADMINISTERED DRUGS (ALT 637 FOR MEDICARE OP): Performed by: OBSTETRICS & GYNECOLOGY

## 2024-09-22 PROCEDURE — 2500000004 HC RX 250 GENERAL PHARMACY W/ HCPCS (ALT 636 FOR OP/ED): Performed by: OBSTETRICS & GYNECOLOGY

## 2024-09-22 RX ORDER — ACETAMINOPHEN 500 MG
1000 TABLET ORAL EVERY 6 HOURS PRN
COMMUNITY
Start: 2024-09-22

## 2024-09-22 RX ORDER — IBUPROFEN 200 MG
600 TABLET ORAL EVERY 6 HOURS
COMMUNITY
Start: 2024-09-22

## 2024-09-22 ASSESSMENT — PAIN DESCRIPTION - DESCRIPTORS: DESCRIPTORS: CRAMPING

## 2024-09-22 ASSESSMENT — PAIN DESCRIPTION - LOCATION: LOCATION: ABDOMEN

## 2024-09-22 ASSESSMENT — PAIN SCALES - GENERAL
PAINLEVEL_OUTOF10: 4
PAINLEVEL_OUTOF10: 5 - MODERATE PAIN
PAINLEVEL_OUTOF10: 5 - MODERATE PAIN
PAINLEVEL_OUTOF10: 0 - NO PAIN

## 2024-09-22 NOTE — PROGRESS NOTES
Postpartum Progress Note    Assessment/Plan   Naa Sharif is a 29 y.o.,  who delivered at 39w2d and is now postpartum day 1.    Doing well, no complications overnight.  Discharge home.   Pain management with Tylenol and Motrin.  Followup 6 weeks postpartum    Subjective   Feels well. No pain. Bleeding tapering.  Wants to home    Objective   Allergies:   Penicillins    Last Vitals:  Temp Pulse Resp BP MAP Pulse Ox   36.7 °C (98.1 °F) 68 16 (!) 89/54   98 %     Vitals Min/Max Last 24 Hours:  Temp  Min: 36.7 °C (98 °F)  Max: 37.5 °C (99.5 °F)  Pulse  Min: 60  Max: 124  Resp  Min: 14  Max: 18  BP  Min: 85/50  Max: 120/64      Physical Exam:  General: no acute distress; she is awake and alert  Lungs: Normal respiratory effort  Abdomen: soft, non-tender, no distention  Fundus: firm  Extremities: no erythema, normal movements  Psychological: appropriate mood and behavior       Binta López MD

## 2024-09-22 NOTE — DISCHARGE SUMMARY
Discharge Summary    Admission Date: 2024  Discharge Date: 2024    Discharge Diagnosis  Term pregnancy ([facility])    Hospital Course  Delivery Date: 2024 3:08 PM  Delivery type: Vaginal, Spontaneous   GA at delivery: 39w2d  Outcome: Living  Anesthesia during delivery: Epidural  Intrapartum complications: None  Feeding method: Breastfeeding Status: No     Procedures:      30 yo  term pregnancy presented in labor. GBS+ received vanco in labor, PCN allergy.  baby girl.   Met postpartum milestones  Discharged home with followup in 6 weeks.      Last Vitals:  Temp Pulse Resp BP MAP Pulse Ox   36.7 °C (98.1 °F) 68 16 (!) 89/54 67 98 %     Discharge Meds     Your medication list        START taking these medications        Instructions Last Dose Given Next Dose Due   acetaminophen 500 mg tablet  Commonly known as: Tylenol      Take 2 tablets (1,000 mg) by mouth every 6 hours if needed for mild pain (1 - 3).       ibuprofen 200 mg tablet      Take 3 tablets (600 mg) by mouth every 6 hours.              CONTINUE taking these medications        Instructions Last Dose Given Next Dose Due   famotidine 10 mg tablet  Commonly known as: Pepcid           ferrous sulfate 325 (65 Fe) MG EC tablet           prenatal19-iron-folic-omega3 29-1-400 mg combo pack,tablet and cap,DR                     Where to Get Your Medications        You can get these medications from any pharmacy    You don't need a prescription for these medications  acetaminophen 500 mg tablet  ibuprofen 200 mg tablet          Complications Requiring Follow-Up  none    Test Results Pending At Discharge  Pending Labs       Order Current Status    Syphilis Screen with Reflex In process            Outpatient Follow-Up  Future Appointments   Date Time Provider Department Center   2024  3:15 PM Sebas Jay MD [id number]         Binta López MD

## 2024-09-22 NOTE — DISCHARGE INSTRUCTIONS
"  [facility]  [address]  [address], [place] [address]  [address], [place] [address]  Telephone: [phone] [place] or [phone] [place]    After Discharge Orders:  Future Appointments   Date Time Provider Department Center   2024  3:15 PM Sebas Jay MD [id number]         After your delivery - signs and symptoms to watch for:  Fever - Oral temperature greater than 100.4 degrees Fahrenheit  Foul-smelling vaginal discharge  Headache unrelieved by \"pain meds\"  Difficulty urinating  Breasts reddened, hard, hot to the touch  Nipple discharge which is foul-smelling or contains pus  Increased pain at the site of the laceration  Difficulty breathing with or without chest pain  New calf pain especially if only on one side  Sudden, continuing increased vaginal bleeding with or without clots  Unrelieved feelings of:  Inability to cope  Sadness  Anxiety  Lack of interest in baby  Insomnia  Crying     What to do at home:  See patient education handouts for full information  Resume activity gradually   Don't lift anything heavier than baby and carrier until OK'd by your Physician  No sex until OK'd by your Physician or Midwife  Take care of yourself by sleeping/resting as much as possible  Eat regular nutritious meals  Let someone else care for you, your baby, and housework as much as possible   Take pain medication as prescribed whenever you need them  Wear compression stockings if prescribed   To avoid/relieve constipation take stool softeners if advised   Drink lots of water/fruit juices  Increase fiber in your diet  Breast care: Wear support bra ; use cool compresses or binding as needed     Refer to  Discharge Instructions for problems or follow-up regarding  feeding   "

## 2024-09-22 NOTE — CARE PLAN
The patient's goals for the shift include bonding with infant; rest; pain management    The clinical goals for the shift include routine postpartum care; completion of homegoing paperwork; discharge to home    Over the shift, the patient did not make progress toward the following goals. Barriers to progression include none. Recommendations to address these barriers include none.

## 2024-09-23 ENCOUNTER — APPOINTMENT (OUTPATIENT)
Dept: OBSTETRICS AND GYNECOLOGY | Facility: CLINIC | Age: 29
End: 2024-09-23
Payer: COMMERCIAL

## 2024-09-24 ENCOUNTER — TELEPHONE (OUTPATIENT)
Dept: OBSTETRICS AND GYNECOLOGY | Facility: CLINIC | Age: 29
End: 2024-09-24
Payer: COMMERCIAL

## 2024-09-30 ENCOUNTER — POSTPARTUM VISIT (OUTPATIENT)
Dept: OBSTETRICS AND GYNECOLOGY | Facility: CLINIC | Age: 29
End: 2024-09-30
Payer: COMMERCIAL

## 2024-09-30 VITALS
SYSTOLIC BLOOD PRESSURE: 108 MMHG | HEIGHT: 60 IN | BODY MASS INDEX: 33.57 KG/M2 | WEIGHT: 171 LBS | DIASTOLIC BLOOD PRESSURE: 62 MMHG

## 2024-09-30 RX ORDER — SERTRALINE HYDROCHLORIDE 25 MG/1
25 TABLET, FILM COATED ORAL DAILY
Qty: 90 TABLET | Refills: 3 | Status: SHIPPED | OUTPATIENT
Start: 2024-09-30 | End: 2025-09-30

## 2024-09-30 ASSESSMENT — EDINBURGH POSTNATAL DEPRESSION SCALE (EPDS)
I HAVE BEEN SO UNHAPPY THAT I HAVE BEEN CRYING: YES, QUITE OFTEN
I HAVE FELT SAD OR MISERABLE: YES, QUITE OFTEN
I HAVE BLAMED MYSELF UNNECESSARILY WHEN THINGS WENT WRONG: YES, SOME OF THE TIME
I HAVE BEEN SO UNHAPPY THAT I HAVE HAD DIFFICULTY SLEEPING: YES, SOMETIMES
I HAVE LOOKED FORWARD WITH ENJOYMENT TO THINGS: DEFINITELY LESS THAN I USED TO
I HAVE BEEN ABLE TO LAUGH AND SEE THE FUNNY SIDE OF THINGS: NOT QUITE SO MUCH NOW
THINGS HAVE BEEN GETTING ON TOP OF ME: YES, SOMETIMES I HAVEN'T BEEN COPING AS WELL AS USUAL
I HAVE BEEN ANXIOUS OR WORRIED FOR NO GOOD REASON: YES, VERY OFTEN
TOTAL SCORE: 19
THE THOUGHT OF HARMING MYSELF HAS OCCURRED TO ME: NEVER
I HAVE FELT SCARED OR PANICKY FOR NO GOOD REASON: YES, QUITE A LOT

## 2024-09-30 NOTE — PROGRESS NOTES
Postpartum Progress Note    Naa Sharif is a 29 y.o., , who delivered at 39w2d gestation and is now postpartum day 9.     She is experiencing pp anxiety and depression x5 days. Had this in the PP period after her first pregnancy and responded well to medication management.  No SI/HI. Patient is concerned about time it might take for pp anxiety/depression to resolve.   Has a good support system at home.    Chaperone -  in room  Advance  Depression Scale: 19    Objective   Allergies:   Penicillins      Assessment/Plan    Postpartum depression   No SI/HI. Discussed that she will alert someone if this changes  Has a good support system and home. Discussed role of professional counseling. Patient declines at this time.  Rx Zoloft sent.  RTC in 1-2 weeks for follow up. Sooner if needed       Scribe Attestation  By signing my name below, ISirisha, Scribe   attest that this documentation has been prepared under the direction and in the presence of Sebas Jay MD.

## 2024-10-31 ENCOUNTER — APPOINTMENT (OUTPATIENT)
Dept: OBSTETRICS AND GYNECOLOGY | Facility: CLINIC | Age: 29
End: 2024-10-31
Payer: COMMERCIAL

## 2024-10-31 VITALS
DIASTOLIC BLOOD PRESSURE: 66 MMHG | HEIGHT: 60 IN | BODY MASS INDEX: 32.59 KG/M2 | SYSTOLIC BLOOD PRESSURE: 100 MMHG | WEIGHT: 166 LBS

## 2024-10-31 DIAGNOSIS — R87.618 PAP SMEAR ABNORMALITY OF CERVIX/HUMAN PAPILLOMAVIRUS (HPV) POSITIVE: ICD-10-CM

## 2024-10-31 PROCEDURE — 87624 HPV HI-RISK TYP POOLED RSLT: CPT

## 2024-10-31 ASSESSMENT — EDINBURGH POSTNATAL DEPRESSION SCALE (EPDS)
I HAVE FELT SCARED OR PANICKY FOR NO GOOD REASON: NO, NOT MUCH
THE THOUGHT OF HARMING MYSELF HAS OCCURRED TO ME: NEVER
I HAVE BLAMED MYSELF UNNECESSARILY WHEN THINGS WENT WRONG: YES, SOME OF THE TIME
TOTAL SCORE: 8
I HAVE FELT SAD OR MISERABLE: NOT VERY OFTEN
I HAVE BEEN SO UNHAPPY THAT I HAVE BEEN CRYING: ONLY OCCASIONALLY
I HAVE BEEN ANXIOUS OR WORRIED FOR NO GOOD REASON: YES, SOMETIMES
I HAVE BEEN ABLE TO LAUGH AND SEE THE FUNNY SIDE OF THINGS: AS MUCH AS I ALWAYS COULD
THINGS HAVE BEEN GETTING ON TOP OF ME: NO, MOST OF THE TIME I HAVE COPED QUITE WELL
I HAVE BEEN SO UNHAPPY THAT I HAVE HAD DIFFICULTY SLEEPING: NOT AT ALL
I HAVE LOOKED FORWARD WITH ENJOYMENT TO THINGS: AS MUCH AS I EVER DID

## 2024-11-15 LAB
